# Patient Record
Sex: FEMALE | Race: WHITE | Employment: FULL TIME | URBAN - METROPOLITAN AREA
[De-identification: names, ages, dates, MRNs, and addresses within clinical notes are randomized per-mention and may not be internally consistent; named-entity substitution may affect disease eponyms.]

---

## 2020-08-20 ENCOUNTER — OFFICE VISIT (OUTPATIENT)
Dept: FAMILY MEDICINE CLINIC | Age: 45
End: 2020-08-20
Payer: COMMERCIAL

## 2020-08-20 VITALS
OXYGEN SATURATION: 100 % | WEIGHT: 165 LBS | SYSTOLIC BLOOD PRESSURE: 134 MMHG | TEMPERATURE: 97.8 F | RESPIRATION RATE: 20 BRPM | HEIGHT: 61 IN | HEART RATE: 100 BPM | BODY MASS INDEX: 31.15 KG/M2 | DIASTOLIC BLOOD PRESSURE: 80 MMHG

## 2020-08-20 PROCEDURE — 99203 OFFICE O/P NEW LOW 30 MIN: CPT | Performed by: INTERNAL MEDICINE

## 2020-08-20 ASSESSMENT — ENCOUNTER SYMPTOMS
NAUSEA: 0
SORE THROAT: 0
EYE PAIN: 0
BLOOD IN STOOL: 0
SHORTNESS OF BREATH: 0
EYE DISCHARGE: 0
SINUS PAIN: 0
ABDOMINAL PAIN: 0

## 2020-08-20 ASSESSMENT — PATIENT HEALTH QUESTIONNAIRE - PHQ9
SUM OF ALL RESPONSES TO PHQ QUESTIONS 1-9: 2
SUM OF ALL RESPONSES TO PHQ9 QUESTIONS 1 & 2: 2
2. FEELING DOWN, DEPRESSED OR HOPELESS: 1
SUM OF ALL RESPONSES TO PHQ QUESTIONS 1-9: 2
1. LITTLE INTEREST OR PLEASURE IN DOING THINGS: 1

## 2020-08-20 NOTE — PROGRESS NOTES
Chief Complaint   Patient presents with    New Patient     Here to establish care, LS a PCP over 5 years ago. Patient follows with OBGYN    Insomnia     Patient unable to stay asleep, and wakes up screamimg according to her     Other     Spot on her R ankle/leg area that came up about 7 years but has increased in size        HPI:  Patient is here as new patient    Used to live in [facility]    Allergy and Medications are reviewed and updated. Past Medical History, Surgical History, and Family History has been reviewed and updated. Review of Systems:  Review of Systems   Constitutional: Negative for chills and fever. HENT: Negative for congestion, sinus pain and sore throat. Eyes: Negative for pain and discharge. Respiratory: Negative for shortness of breath (No new SOb). Cardiovascular: Negative for chest pain. Gastrointestinal: Negative for abdominal pain, blood in stool and nausea. Genitourinary: Negative for flank pain and frequency. Musculoskeletal: Negative for neck pain. Hematological: Does not bruise/bleed easily. Psychiatric/Behavioral: Negative for suicidal ideas. Vitals:    08/20/20 1139   BP: 134/80   Pulse: 100   Resp: 20   Temp: 97.8 °F (36.6 °C)   TempSrc: Temporal   SpO2: 100%   Weight: 165 lb (74.8 kg)   Height: 5' 1\" (1.549 m)       Physical Exam  Vitals signs reviewed. Constitutional:       Appearance: She is well-developed. HENT:      Head: Normocephalic and atraumatic. Mouth/Throat:      Pharynx: No oropharyngeal exudate. Eyes:      Conjunctiva/sclera: Conjunctivae normal.      Pupils: Pupils are equal, round, and reactive to light. Neck:      Vascular: No JVD. Cardiovascular:      Rate and Rhythm: Normal rate and regular rhythm. Pulmonary:      Effort: Pulmonary effort is normal.      Breath sounds: Normal breath sounds. No rales. Abdominal:      General: Bowel sounds are normal.      Palpations: Abdomen is soft.    Musculoskeletal: Normal range of motion. Lymphadenopathy:      Cervical: No cervical adenopathy. Skin:     General: Skin is warm and dry. Neurological:      Mental Status: She is alert and oriented to person, place, and time. Psychiatric:         Behavior: Behavior normal.          Labs :    Lab Results   Component Value Date    WBC 10.6 06/29/2017    HGB 9.6 (L) 06/29/2017    HCT 33.0 (L) 06/29/2017     06/29/2017    ALT 7 06/29/2017    AST 18 06/29/2017     06/29/2017    K 4.0 06/29/2017     06/29/2017    CREATININE 0.9 06/29/2017    BUN 11 06/29/2017    CO2 22 06/29/2017     Lab Results   Component Value Date    COLORU RED 06/29/2017    NITRU Negative 06/29/2017    GLUCOSEU Negative 06/29/2017    KETUA TRACE 06/29/2017    UROBILINOGEN 1.0 06/29/2017    BILIRUBINUR SMALL 06/29/2017           ASSESSMENT   There are no active problems to display for this patient. Diagnosis:     ICD-10-CM    1. Establishing care with new doctor, encounter for  Z76.89 CBC Auto Differential     Comprehensive Metabolic Panel, Fasting     Lipid, Fasting     TSH without Reflex     Urinalysis with Microscopic   2. Panic anxiety syndrome  F41.0 External Referral To Psychiatry   3. Vitamin D deficiency  E55.9 Vitamin D 25 Hydroxy       PLAN:       Pt is stable on current medical treatment. Continue current treatment plan    Side effects/Adverse effects/Precautions are reviewed with patient. Low salt, Low Carb diet an low fat diet  Continue medications as advised and take them regularly  Regular exercises as advised    Discussed natural and expected course of this diagnosis and need to alert me if symptoms do not follow expected course, or if any worse. Smoking cessation if applicable, discussed with patient. Ref to Dr Nunu Baxter       There are no Patient Instructions on file for this visit. Return in about 3 weeks (around 9/10/2020).

## 2020-09-11 ENCOUNTER — HOSPITAL ENCOUNTER (OUTPATIENT)
Age: 45
Discharge: HOME | End: 2020-09-13
Payer: COMMERCIAL

## 2020-09-11 LAB
ALBUMIN SERPL-MCNC: 4.2 G/DL (ref 3.5–5.2)
ALP BLD-CCNC: 55 U/L (ref 35–104)
ALT SERPL-CCNC: 6 U/L (ref 0–32)
ANION GAP SERPL CALCULATED.3IONS-SCNC: 16 MMOL/L (ref 7–16)
ANISOCYTOSIS: ABNORMAL
AST SERPL-CCNC: 11 U/L (ref 0–31)
BACTERIA: NORMAL /HPF
BASOPHILS ABSOLUTE: 0.04 E9/L (ref 0–0.2)
BASOPHILS RELATIVE PERCENT: 0.5 % (ref 0–2)
BILIRUB SERPL-MCNC: <0.2 MG/DL (ref 0–1.2)
BILIRUBIN URINE: NEGATIVE
BLOOD, URINE: NEGATIVE
BUN BLDV-MCNC: 11 MG/DL (ref 6–20)
BURR CELLS: ABNORMAL
CALCIUM SERPL-MCNC: 9.2 MG/DL (ref 8.6–10.2)
CHLORIDE BLD-SCNC: 106 MMOL/L (ref 98–107)
CHOLESTEROL, FASTING: 149 MG/DL (ref 0–199)
CLARITY: CLEAR
CO2: 20 MMOL/L (ref 22–29)
COLOR: YELLOW
CREAT SERPL-MCNC: 1 MG/DL (ref 0.5–1)
EOSINOPHILS ABSOLUTE: 0.26 E9/L (ref 0.05–0.5)
EOSINOPHILS RELATIVE PERCENT: 3.2 % (ref 0–6)
GFR AFRICAN AMERICAN: >60
GFR NON-AFRICAN AMERICAN: 60 ML/MIN/1.73
GLUCOSE FASTING: 98 MG/DL (ref 74–99)
GLUCOSE URINE: NEGATIVE MG/DL
HCT VFR BLD CALC: 32.6 % (ref 34–48)
HDLC SERPL-MCNC: 42 MG/DL
HEMOGLOBIN: 8.5 G/DL (ref 11.5–15.5)
HYPOCHROMIA: ABNORMAL
IMMATURE GRANULOCYTES #: 0.03 E9/L
IMMATURE GRANULOCYTES %: 0.4 % (ref 0–5)
KETONES, URINE: NEGATIVE MG/DL
LDL CHOLESTEROL CALCULATED: 90 MG/DL (ref 0–99)
LEUKOCYTE ESTERASE, URINE: NEGATIVE
LYMPHOCYTES ABSOLUTE: 2 E9/L (ref 1.5–4)
LYMPHOCYTES RELATIVE PERCENT: 24.5 % (ref 20–42)
MCH RBC QN AUTO: 16.6 PG (ref 26–35)
MCHC RBC AUTO-ENTMCNC: 26.1 % (ref 32–34.5)
MCV RBC AUTO: 63.8 FL (ref 80–99.9)
MONOCYTES ABSOLUTE: 0.52 E9/L (ref 0.1–0.95)
MONOCYTES RELATIVE PERCENT: 6.4 % (ref 2–12)
NEUTROPHILS ABSOLUTE: 5.32 E9/L (ref 1.8–7.3)
NEUTROPHILS RELATIVE PERCENT: 65 % (ref 43–80)
NITRITE, URINE: NEGATIVE
OVALOCYTES: ABNORMAL
PDW BLD-RTO: 20.2 FL (ref 11.5–15)
PH UA: 5.5 (ref 5–9)
PLATELET # BLD: 350 E9/L (ref 130–450)
PMV BLD AUTO: 10.8 FL (ref 7–12)
POIKILOCYTES: ABNORMAL
POLYCHROMASIA: ABNORMAL
POTASSIUM SERPL-SCNC: 4.1 MMOL/L (ref 3.5–5)
PROTEIN UA: NEGATIVE MG/DL
RBC # BLD: 5.11 E12/L (ref 3.5–5.5)
RBC UA: NORMAL /HPF (ref 0–2)
SODIUM BLD-SCNC: 142 MMOL/L (ref 132–146)
SPECIFIC GRAVITY UA: 1.02 (ref 1–1.03)
TEAR DROP CELLS: ABNORMAL
TOTAL PROTEIN: 6.8 G/DL (ref 6.4–8.3)
TRIGLYCERIDE, FASTING: 85 MG/DL (ref 0–149)
TSH SERPL DL<=0.05 MIU/L-ACNC: 4.97 UIU/ML (ref 0.27–4.2)
UROBILINOGEN, URINE: 0.2 E.U./DL
VITAMIN D 25-HYDROXY: 28 NG/ML (ref 30–100)
VLDLC SERPL CALC-MCNC: 17 MG/DL
WBC # BLD: 8.2 E9/L (ref 4.5–11.5)
WBC UA: NORMAL /HPF (ref 0–5)

## 2020-09-11 PROCEDURE — 80053 COMPREHEN METABOLIC PANEL: CPT

## 2020-09-11 PROCEDURE — 36415 COLL VENOUS BLD VENIPUNCTURE: CPT

## 2020-09-11 PROCEDURE — 80061 LIPID PANEL: CPT

## 2020-09-11 PROCEDURE — 81001 URINALYSIS AUTO W/SCOPE: CPT

## 2020-09-11 PROCEDURE — 82306 VITAMIN D 25 HYDROXY: CPT

## 2020-09-11 PROCEDURE — 85025 COMPLETE CBC W/AUTO DIFF WBC: CPT

## 2020-09-11 PROCEDURE — 84443 ASSAY THYROID STIM HORMONE: CPT

## 2020-09-21 ENCOUNTER — OFFICE VISIT (OUTPATIENT)
Dept: FAMILY MEDICINE CLINIC | Age: 45
End: 2020-09-21
Payer: COMMERCIAL

## 2020-09-21 VITALS
HEART RATE: 84 BPM | DIASTOLIC BLOOD PRESSURE: 72 MMHG | BODY MASS INDEX: 31.15 KG/M2 | TEMPERATURE: 97.7 F | OXYGEN SATURATION: 100 % | RESPIRATION RATE: 14 BRPM | SYSTOLIC BLOOD PRESSURE: 124 MMHG | WEIGHT: 165 LBS | HEIGHT: 61 IN

## 2020-09-21 PROCEDURE — 99213 OFFICE O/P EST LOW 20 MIN: CPT | Performed by: INTERNAL MEDICINE

## 2020-09-21 ASSESSMENT — ENCOUNTER SYMPTOMS
EYE PAIN: 0
SORE THROAT: 0
SINUS PAIN: 0
BLOOD IN STOOL: 0
ABDOMINAL PAIN: 0
NAUSEA: 0
SHORTNESS OF BREATH: 0
EYE DISCHARGE: 0

## 2020-09-21 NOTE — PROGRESS NOTES
Chief Complaint   Patient presents with    Discuss Labs       HPI:  Patient is here for follow-up    Has appt with counselor next week        Allergy and Medications are reviewed and updated. Past Medical History, Surgical History, and Family History has been reviewed and updated. Review of Systems:  Review of Systems   Constitutional: Negative for chills and fever. HENT: Negative for congestion, sinus pain and sore throat. Eyes: Negative for pain and discharge. Respiratory: Negative for shortness of breath (No new SOb). Cardiovascular: Negative for chest pain. Gastrointestinal: Negative for abdominal pain, blood in stool and nausea. Genitourinary: Negative for flank pain and frequency. Musculoskeletal: Negative for neck pain. Hematological: Does not bruise/bleed easily. Psychiatric/Behavioral: Negative for suicidal ideas. Vitals:    09/21/20 1534   BP: 124/72   Pulse: 84   Resp: 14   Temp: 97.7 °F (36.5 °C)   TempSrc: Temporal   SpO2: 100%   Weight: 165 lb (74.8 kg)   Height: 5' 1\" (1.549 m)       Physical Exam  Vitals signs reviewed. Constitutional:       Appearance: She is well-developed. HENT:      Head: Normocephalic and atraumatic. Mouth/Throat:      Pharynx: No oropharyngeal exudate. Eyes:      Conjunctiva/sclera: Conjunctivae normal.      Pupils: Pupils are equal, round, and reactive to light. Neck:      Vascular: No JVD. Cardiovascular:      Rate and Rhythm: Normal rate and regular rhythm. Pulmonary:      Effort: Pulmonary effort is normal.      Breath sounds: Normal breath sounds. No rales. Abdominal:      General: Bowel sounds are normal.      Palpations: Abdomen is soft. Musculoskeletal: Normal range of motion. Lymphadenopathy:      Cervical: No cervical adenopathy. Skin:     General: Skin is warm and dry. Neurological:      Mental Status: She is alert and oriented to person, place, and time.    Psychiatric:         Behavior: Behavior normal. Labs :    Lab Results   Component Value Date    WBC 8.2 09/11/2020    HGB 8.5 (L) 09/11/2020    HCT 32.6 (L) 09/11/2020     09/11/2020    HDL 42 09/11/2020    ALT 6 09/11/2020    AST 11 09/11/2020     09/11/2020    K 4.1 09/11/2020     09/11/2020    CREATININE 1.0 09/11/2020    BUN 11 09/11/2020    CO2 20 (L) 09/11/2020    TSH 4.970 (H) 09/11/2020    GLUF 98 09/11/2020     Lab Results   Component Value Date    COLORU Yellow 09/11/2020    NITRU Negative 09/11/2020    GLUCOSEU Negative 09/11/2020    KETUA Negative 09/11/2020    UROBILINOGEN 0.2 09/11/2020    BILIRUBINUR Negative 09/11/2020           ASSESSMENT   There are no active problems to display for this patient. Diagnosis:     ICD-10-CM    1. Microcytic anemia  D50.9 Ferritin     Iron and TIBC     IRON SATURATION     Amb External Referral To Gastroenterology   2. Vitamin D deficiency  E55.9    3. Elevated TSH  R79.89        PLAN:      Check Iron lab work    Ref to GI    Pt will follow up with her Ob Gyn     Vit D 3 1000 units daily    TSH is elevated - monitor for now    Pt is stable on current medical treatment. Continue current treatment plan    Side effects/Adverse effects/Precautions are reviewed with patient. Low salt, Low Carb diet an low fat diet  Continue medications as advised and take them regularly  Regular exercises as advised    Discussed natural and expected course of this diagnosis and need to alert me if symptoms do not follow expected course, or if any worse. Smoking cessation if applicable, discussed with patient. There are no Patient Instructions on file for this visit. Return in about 1 month (around 10/21/2020).

## 2020-10-19 ENCOUNTER — HOSPITAL ENCOUNTER (OUTPATIENT)
Age: 45
Discharge: HOME | End: 2020-10-21
Payer: COMMERCIAL

## 2020-10-19 LAB
FERRITIN: 4 NG/ML
IRON SATURATION: 4 % (ref 15–50)
IRON: 20 MCG/DL (ref 37–145)
TOTAL IRON BINDING CAPACITY: 461 MCG/DL (ref 250–450)

## 2020-10-19 PROCEDURE — 83550 IRON BINDING TEST: CPT

## 2020-10-19 PROCEDURE — 83540 ASSAY OF IRON: CPT

## 2020-10-19 PROCEDURE — 36415 COLL VENOUS BLD VENIPUNCTURE: CPT

## 2020-10-19 PROCEDURE — 82728 ASSAY OF FERRITIN: CPT

## 2020-10-22 ENCOUNTER — OFFICE VISIT (OUTPATIENT)
Dept: FAMILY MEDICINE CLINIC | Age: 45
End: 2020-10-22
Payer: COMMERCIAL

## 2020-10-22 VITALS
RESPIRATION RATE: 18 BRPM | BODY MASS INDEX: 31.74 KG/M2 | WEIGHT: 168.1 LBS | HEART RATE: 73 BPM | TEMPERATURE: 97 F | HEIGHT: 61 IN | OXYGEN SATURATION: 100 % | SYSTOLIC BLOOD PRESSURE: 128 MMHG | DIASTOLIC BLOOD PRESSURE: 68 MMHG

## 2020-10-22 PROCEDURE — 99213 OFFICE O/P EST LOW 20 MIN: CPT | Performed by: INTERNAL MEDICINE

## 2020-10-22 RX ORDER — FLUOXETINE HYDROCHLORIDE 20 MG/1
20 CAPSULE ORAL DAILY
COMMUNITY
Start: 2020-09-23

## 2020-10-22 RX ORDER — HYDROXYZINE PAMOATE 50 MG/1
50 CAPSULE ORAL NIGHTLY PRN
COMMUNITY
Start: 2020-09-23 | End: 2022-01-01

## 2020-10-22 RX ORDER — FERROUS SULFATE 325(65) MG
325 TABLET ORAL
Qty: 90 TABLET | Refills: 0 | Status: SHIPPED
Start: 2020-10-22 | End: 2021-01-14 | Stop reason: SDUPTHER

## 2020-10-22 ASSESSMENT — ENCOUNTER SYMPTOMS
ABDOMINAL PAIN: 0
NAUSEA: 0
SHORTNESS OF BREATH: 0
EYE DISCHARGE: 0
BLOOD IN STOOL: 0
EYE PAIN: 0
SINUS PAIN: 0
SORE THROAT: 0

## 2020-10-22 NOTE — PROGRESS NOTES
Chief Complaint   Patient presents with    Follow-up    Discuss Labs       HPI:  Patient is here for follow-up     Feel okay     Pt is following with GI and also with Counsellor     Feeling okay   Allergy and Medications are reviewed and updated. Past Medical History, Surgical History, and Family History has been reviewed and updated. Review of Systems:  Review of Systems   Constitutional: Negative for chills and fever. HENT: Negative for congestion, sinus pain and sore throat. Eyes: Negative for pain and discharge. Respiratory: Negative for shortness of breath (No new SOb). Cardiovascular: Negative for chest pain. Gastrointestinal: Negative for abdominal pain, blood in stool and nausea. Genitourinary: Negative for flank pain and frequency. Musculoskeletal: Negative for neck pain. Hematological: Does not bruise/bleed easily. Psychiatric/Behavioral: Negative for suicidal ideas. Vitals:    10/22/20 1554   BP: 128/68   Pulse: 73   Resp: 18   Temp: 97 °F (36.1 °C)   SpO2: 100%   Weight: 168 lb 1.6 oz (76.2 kg)   Height: 5' 1\" (1.549 m)       Physical Exam  Vitals signs reviewed. Constitutional:       Appearance: She is well-developed. HENT:      Head: Normocephalic and atraumatic. Mouth/Throat:      Pharynx: No oropharyngeal exudate. Eyes:      Conjunctiva/sclera: Conjunctivae normal.      Pupils: Pupils are equal, round, and reactive to light. Neck:      Vascular: No JVD. Cardiovascular:      Rate and Rhythm: Normal rate and regular rhythm. Pulmonary:      Effort: Pulmonary effort is normal.      Breath sounds: Normal breath sounds. No rales. Abdominal:      General: Bowel sounds are normal.      Palpations: Abdomen is soft. Musculoskeletal: Normal range of motion. Lymphadenopathy:      Cervical: No cervical adenopathy. Skin:     General: Skin is warm and dry. Neurological:      Mental Status: She is alert and oriented to person, place, and time. Psychiatric:         Behavior: Behavior normal.          Labs :    Lab Results   Component Value Date    WBC 8.2 09/11/2020    HGB 8.5 (L) 09/11/2020    HCT 32.6 (L) 09/11/2020     09/11/2020    HDL 42 09/11/2020    ALT 6 09/11/2020    AST 11 09/11/2020     09/11/2020    K 4.1 09/11/2020     09/11/2020    CREATININE 1.0 09/11/2020    BUN 11 09/11/2020    CO2 20 (L) 09/11/2020    TSH 4.970 (H) 09/11/2020    GLUF 98 09/11/2020     Lab Results   Component Value Date    COLORU Yellow 09/11/2020    NITRU Negative 09/11/2020    GLUCOSEU Negative 09/11/2020    KETUA Negative 09/11/2020    UROBILINOGEN 0.2 09/11/2020    BILIRUBINUR Negative 09/11/2020           ASSESSMENT   There are no active problems to display for this patient. Diagnosis:     ICD-10-CM    1. Microcytic anemia  D50.9    2. Panic anxiety syndrome  F41.0        PLAN:      Labs reviewed    Has HIRA    Following with GI, adv to f/u with Ob Gyn of her choice    Pt is stable on current medical treatment. Continue current treatment plan    Side effects/Adverse effects/Precautions are reviewed with patient. Low salt, Low Carb diet an low fat diet  Continue medications as advised and take them regularly  Regular exercises as advised    Discussed natural and expected course of this diagnosis and need to alert me if symptoms do not follow expected course, or if any worse. Smoking cessation if applicable, discussed with patient. There are no Patient Instructions on file for this visit. Return in about 2 months (around 12/22/2020).

## 2021-01-01 ENCOUNTER — OFFICE VISIT (OUTPATIENT)
Dept: FAMILY MEDICINE CLINIC | Age: 46
End: 2021-01-01
Payer: COMMERCIAL

## 2021-01-01 ENCOUNTER — HOSPITAL ENCOUNTER (OUTPATIENT)
Dept: GENERAL RADIOLOGY | Age: 46
Discharge: HOME | End: 2021-12-31
Payer: COMMERCIAL

## 2021-01-01 VITALS
RESPIRATION RATE: 16 BRPM | HEIGHT: 61 IN | HEART RATE: 86 BPM | OXYGEN SATURATION: 98 % | SYSTOLIC BLOOD PRESSURE: 116 MMHG | DIASTOLIC BLOOD PRESSURE: 74 MMHG | TEMPERATURE: 97.5 F | WEIGHT: 179.1 LBS | BODY MASS INDEX: 33.81 KG/M2

## 2021-01-01 DIAGNOSIS — G89.29 CHRONIC PAIN IN RIGHT FOOT: ICD-10-CM

## 2021-01-01 DIAGNOSIS — N63.10 BREAST MASS, RIGHT: ICD-10-CM

## 2021-01-01 DIAGNOSIS — Z12.11 COLON CANCER SCREENING: ICD-10-CM

## 2021-01-01 DIAGNOSIS — D50.9 MICROCYTIC ANEMIA: ICD-10-CM

## 2021-01-01 DIAGNOSIS — M79.671 CHRONIC PAIN IN RIGHT FOOT: ICD-10-CM

## 2021-01-01 DIAGNOSIS — E87.6 LOW BLOOD POTASSIUM: ICD-10-CM

## 2021-01-01 DIAGNOSIS — H60.501 ACUTE OTITIS EXTERNA OF RIGHT EAR, UNSPECIFIED TYPE: Primary | ICD-10-CM

## 2021-01-01 PROCEDURE — G0279 TOMOSYNTHESIS, MAMMO: HCPCS

## 2021-01-01 PROCEDURE — 76642 ULTRASOUND BREAST LIMITED: CPT

## 2021-01-01 PROCEDURE — 99214 OFFICE O/P EST MOD 30 MIN: CPT | Performed by: INTERNAL MEDICINE

## 2021-01-01 RX ORDER — AZITHROMYCIN 250 MG/1
TABLET, FILM COATED ORAL
Qty: 6 TABLET | Refills: 0 | Status: SHIPPED
Start: 2021-01-01 | End: 2022-01-01

## 2021-01-01 ASSESSMENT — ENCOUNTER SYMPTOMS
NAUSEA: 0
ABDOMINAL PAIN: 0
EYE DISCHARGE: 0
BLOOD IN STOOL: 0
SINUS PAIN: 0
EYE PAIN: 0
SHORTNESS OF BREATH: 0
SORE THROAT: 0

## 2021-01-12 ENCOUNTER — TELEPHONE (OUTPATIENT)
Dept: FAMILY MEDICINE CLINIC | Age: 46
End: 2021-01-12

## 2021-01-12 NOTE — TELEPHONE ENCOUNTER
Patient wants to know if you would like any more lab work on her. She will be out of iron medication in 8 days.

## 2021-01-14 ENCOUNTER — OFFICE VISIT (OUTPATIENT)
Dept: FAMILY MEDICINE CLINIC | Age: 46
End: 2021-01-14
Payer: COMMERCIAL

## 2021-01-14 VITALS
HEIGHT: 61 IN | DIASTOLIC BLOOD PRESSURE: 72 MMHG | WEIGHT: 182 LBS | RESPIRATION RATE: 17 BRPM | HEART RATE: 75 BPM | OXYGEN SATURATION: 98 % | BODY MASS INDEX: 34.36 KG/M2 | TEMPERATURE: 97.1 F | SYSTOLIC BLOOD PRESSURE: 120 MMHG

## 2021-01-14 DIAGNOSIS — E55.9 VITAMIN D DEFICIENCY: ICD-10-CM

## 2021-01-14 DIAGNOSIS — D50.9 MICROCYTIC ANEMIA: Primary | ICD-10-CM

## 2021-01-14 DIAGNOSIS — F41.0 PANIC ANXIETY SYNDROME: ICD-10-CM

## 2021-01-14 DIAGNOSIS — R79.89 ELEVATED TSH: ICD-10-CM

## 2021-01-14 PROCEDURE — 99213 OFFICE O/P EST LOW 20 MIN: CPT | Performed by: INTERNAL MEDICINE

## 2021-01-14 RX ORDER — FERROUS SULFATE 325(65) MG
325 TABLET ORAL
Qty: 90 TABLET | Refills: 0 | Status: SHIPPED
Start: 2021-01-14 | End: 2021-01-01

## 2021-01-14 ASSESSMENT — ENCOUNTER SYMPTOMS
SINUS PAIN: 0
SHORTNESS OF BREATH: 0
ABDOMINAL PAIN: 0
SORE THROAT: 0
EYE DISCHARGE: 0
BLOOD IN STOOL: 0
NAUSEA: 0
EYE PAIN: 0

## 2021-01-14 ASSESSMENT — PATIENT HEALTH QUESTIONNAIRE - PHQ9
SUM OF ALL RESPONSES TO PHQ QUESTIONS 1-9: 0
SUM OF ALL RESPONSES TO PHQ9 QUESTIONS 1 & 2: 0
2. FEELING DOWN, DEPRESSED OR HOPELESS: 0

## 2021-01-14 NOTE — PROGRESS NOTES
Chief Complaint   Patient presents with    Other     follow up two mos       HPI:  Patient is here for follow-up     Feel okay  Gain weight since on Prozac   Following with her psych, will discuss with her         Allergy and Medications are reviewed and updated. Past Medical History, Surgical History, and Family History has been reviewed and updated. Review of Systems:  Review of Systems   Constitutional: Negative for chills and fever. HENT: Negative for congestion, sinus pain and sore throat. Eyes: Negative for pain and discharge. Respiratory: Negative for shortness of breath (No new SOb). Cardiovascular: Negative for chest pain. Gastrointestinal: Negative for abdominal pain, blood in stool and nausea. Genitourinary: Negative for flank pain and frequency. Musculoskeletal: Negative for neck pain. Hematological: Does not bruise/bleed easily. Psychiatric/Behavioral: Negative for suicidal ideas. Vitals:    01/14/21 1619   BP: 120/72   Site: Left Upper Arm   Position: Sitting   Cuff Size: Medium Adult   Pulse: 75   Resp: 17   Temp: 97.1 °F (36.2 °C)   TempSrc: Temporal   SpO2: 98%   Weight: 182 lb (82.6 kg)   Height: 5' 1\" (1.549 m)       Physical Exam  Vitals signs reviewed. Constitutional:       Appearance: She is well-developed. HENT:      Head: Normocephalic and atraumatic. Mouth/Throat:      Pharynx: No oropharyngeal exudate. Eyes:      Conjunctiva/sclera: Conjunctivae normal.      Pupils: Pupils are equal, round, and reactive to light. Neck:      Vascular: No JVD. Cardiovascular:      Rate and Rhythm: Normal rate and regular rhythm. Pulmonary:      Effort: Pulmonary effort is normal.      Breath sounds: Normal breath sounds. No rales. Abdominal:      General: Bowel sounds are normal.      Palpations: Abdomen is soft. Musculoskeletal: Normal range of motion. Lymphadenopathy:      Cervical: No cervical adenopathy. Skin:     General: Skin is warm and dry. Neurological:      Mental Status: She is alert and oriented to person, place, and time. Psychiatric:         Behavior: Behavior normal.          Labs :    Lab Results   Component Value Date    WBC 8.2 09/11/2020    HGB 8.5 (L) 09/11/2020    HCT 32.6 (L) 09/11/2020     09/11/2020    HDL 42 09/11/2020    ALT 6 09/11/2020    AST 11 09/11/2020     09/11/2020    K 4.1 09/11/2020     09/11/2020    CREATININE 1.0 09/11/2020    BUN 11 09/11/2020    CO2 20 (L) 09/11/2020    TSH 4.970 (H) 09/11/2020    GLUF 98 09/11/2020     Lab Results   Component Value Date    COLORU Yellow 09/11/2020    NITRU Negative 09/11/2020    GLUCOSEU Negative 09/11/2020    KETUA Negative 09/11/2020    UROBILINOGEN 0.2 09/11/2020    BILIRUBINUR Negative 09/11/2020           ASSESSMENT   There are no active problems to display for this patient. Diagnosis:     ICD-10-CM    1. Microcytic anemia  D50.9 CBC Auto Differential     Ferritin     IRON AND TIBC   2. Panic anxiety syndrome  F41.0    3. Vitamin D deficiency  E55.9 Vitamin D 25 Hydroxy   4. Elevated TSH  R79.89 TSH without Reflex       PLAN:         Advise to have ( Fasting) lab test prior to next visit. Pt is stable on current medical treatment. Continue current treatment plan    Side effects/Adverse effects/Precautions are reviewed with patient. Low salt, Low Carb diet an low fat diet  Continue medications as advised and take them regularly  Regular exercises as advised    Discussed natural and expected course of this diagnosis and need to alert me if symptoms do not follow expected course, or if any worse. Smoking cessation if applicable, discussed with patient. There are no Patient Instructions on file for this visit. Return in about 2 months (around 3/14/2021).

## 2021-03-15 DIAGNOSIS — R79.89 ELEVATED TSH: ICD-10-CM

## 2021-03-15 DIAGNOSIS — D50.9 MICROCYTIC ANEMIA: ICD-10-CM

## 2021-03-15 DIAGNOSIS — E55.9 VITAMIN D DEFICIENCY: ICD-10-CM

## 2021-03-15 LAB
BASOPHILS ABSOLUTE: 0.09 E9/L (ref 0–0.2)
BASOPHILS RELATIVE PERCENT: 0.8 % (ref 0–2)
EOSINOPHILS ABSOLUTE: 0.39 E9/L (ref 0.05–0.5)
EOSINOPHILS RELATIVE PERCENT: 3.4 % (ref 0–6)
FERRITIN: 33 NG/ML
HCT VFR BLD CALC: 48.4 % (ref 34–48)
HEMOGLOBIN: 15.4 G/DL (ref 11.5–15.5)
IMMATURE GRANULOCYTES #: 0.03 E9/L
IMMATURE GRANULOCYTES %: 0.3 % (ref 0–5)
IRON SATURATION: 20 % (ref 15–50)
IRON: 66 MCG/DL (ref 37–145)
LYMPHOCYTES ABSOLUTE: 2.66 E9/L (ref 1.5–4)
LYMPHOCYTES RELATIVE PERCENT: 23 % (ref 20–42)
MCH RBC QN AUTO: 27.5 PG (ref 26–35)
MCHC RBC AUTO-ENTMCNC: 31.8 % (ref 32–34.5)
MCV RBC AUTO: 86.4 FL (ref 80–99.9)
MONOCYTES ABSOLUTE: 0.69 E9/L (ref 0.1–0.95)
MONOCYTES RELATIVE PERCENT: 6 % (ref 2–12)
NEUTROPHILS ABSOLUTE: 7.73 E9/L (ref 1.8–7.3)
NEUTROPHILS RELATIVE PERCENT: 66.5 % (ref 43–80)
PDW BLD-RTO: 14.6 FL (ref 11.5–15)
PLATELET # BLD: 340 E9/L (ref 130–450)
PMV BLD AUTO: 10.4 FL (ref 7–12)
RBC # BLD: 5.6 E12/L (ref 3.5–5.5)
TOTAL IRON BINDING CAPACITY: 327 MCG/DL (ref 250–450)
TSH SERPL DL<=0.05 MIU/L-ACNC: 3.86 UIU/ML (ref 0.27–4.2)
VITAMIN D 25-HYDROXY: 28 NG/ML (ref 30–100)
WBC # BLD: 11.6 E9/L (ref 4.5–11.5)

## 2021-03-17 ENCOUNTER — OFFICE VISIT (OUTPATIENT)
Dept: FAMILY MEDICINE CLINIC | Age: 46
End: 2021-03-17
Payer: COMMERCIAL

## 2021-03-17 VITALS
TEMPERATURE: 97.6 F | SYSTOLIC BLOOD PRESSURE: 125 MMHG | OXYGEN SATURATION: 97 % | DIASTOLIC BLOOD PRESSURE: 70 MMHG | HEIGHT: 61 IN | HEART RATE: 57 BPM | WEIGHT: 185 LBS | RESPIRATION RATE: 18 BRPM | BODY MASS INDEX: 34.93 KG/M2

## 2021-03-17 DIAGNOSIS — E55.9 VITAMIN D DEFICIENCY: ICD-10-CM

## 2021-03-17 DIAGNOSIS — D50.9 MICROCYTIC ANEMIA: Primary | ICD-10-CM

## 2021-03-17 DIAGNOSIS — F41.0 PANIC ANXIETY SYNDROME: ICD-10-CM

## 2021-03-17 PROCEDURE — 99214 OFFICE O/P EST MOD 30 MIN: CPT | Performed by: INTERNAL MEDICINE

## 2021-03-17 SDOH — ECONOMIC STABILITY: TRANSPORTATION INSECURITY
IN THE PAST 12 MONTHS, HAS THE LACK OF TRANSPORTATION KEPT YOU FROM MEDICAL APPOINTMENTS OR FROM GETTING MEDICATIONS?: NO

## 2021-03-17 SDOH — ECONOMIC STABILITY: INCOME INSECURITY: HOW HARD IS IT FOR YOU TO PAY FOR THE VERY BASICS LIKE FOOD, HOUSING, MEDICAL CARE, AND HEATING?: NOT HARD AT ALL

## 2021-03-17 SDOH — ECONOMIC STABILITY: FOOD INSECURITY: WITHIN THE PAST 12 MONTHS, YOU WORRIED THAT YOUR FOOD WOULD RUN OUT BEFORE YOU GOT MONEY TO BUY MORE.: NEVER TRUE

## 2021-03-17 ASSESSMENT — ENCOUNTER SYMPTOMS
SHORTNESS OF BREATH: 0
SORE THROAT: 0
ABDOMINAL PAIN: 0
NAUSEA: 0
BLOOD IN STOOL: 0
EYE PAIN: 0
SINUS PAIN: 0
EYE DISCHARGE: 0

## 2021-03-17 NOTE — PROGRESS NOTES
Chief Complaint   Patient presents with    Anemia     lab results       HPI:  Patient is here for follow-up       Feeling well    No complaints        Allergy and Medications are reviewed and updated. Past Medical History, Surgical History, and Family History has been reviewed and updated. Review of Systems:  Review of Systems   Constitutional: Negative for chills and fever. HENT: Negative for congestion, sinus pain and sore throat. Eyes: Negative for pain and discharge. Respiratory: Negative for shortness of breath (No new SOb). Cardiovascular: Negative for chest pain. Gastrointestinal: Negative for abdominal pain, blood in stool and nausea. Genitourinary: Negative for flank pain and frequency. Musculoskeletal: Negative for neck pain. Hematological: Does not bruise/bleed easily. Psychiatric/Behavioral: Negative for suicidal ideas. Vitals:    03/17/21 1500   BP: 125/70   Site: Left Upper Arm   Position: Sitting   Cuff Size: Medium Adult   Pulse: 57   Resp: 18   Temp: 97.6 °F (36.4 °C)   TempSrc: Temporal   SpO2: 97%   Weight: 185 lb (83.9 kg)   Height: 5' 1\" (1.549 m)       Physical Exam  Vitals signs reviewed. Constitutional:       Appearance: She is well-developed. HENT:      Head: Normocephalic and atraumatic. Mouth/Throat:      Pharynx: No oropharyngeal exudate. Eyes:      Conjunctiva/sclera: Conjunctivae normal.      Pupils: Pupils are equal, round, and reactive to light. Neck:      Vascular: No JVD. Cardiovascular:      Rate and Rhythm: Normal rate and regular rhythm. Pulmonary:      Effort: Pulmonary effort is normal.      Breath sounds: Normal breath sounds. No rales. Abdominal:      General: Bowel sounds are normal.      Palpations: Abdomen is soft. Musculoskeletal: Normal range of motion. Lymphadenopathy:      Cervical: No cervical adenopathy. Skin:     General: Skin is warm and dry.    Neurological:      Mental Status: She is alert and oriented to person, place, and time. Psychiatric:         Behavior: Behavior normal.          Labs :    Lab Results   Component Value Date    WBC 11.6 (H) 03/15/2021    HGB 15.4 03/15/2021    HCT 48.4 (H) 03/15/2021     03/15/2021    HDL 42 09/11/2020    ALT 6 09/11/2020    AST 11 09/11/2020     09/11/2020    K 4.1 09/11/2020     09/11/2020    CREATININE 1.0 09/11/2020    BUN 11 09/11/2020    CO2 20 (L) 09/11/2020    TSH 3.860 03/15/2021    GLUF 98 09/11/2020     Lab Results   Component Value Date    COLORU Yellow 09/11/2020    NITRU Negative 09/11/2020    GLUCOSEU Negative 09/11/2020    KETUA Negative 09/11/2020    UROBILINOGEN 0.2 09/11/2020    BILIRUBINUR Negative 09/11/2020           ASSESSMENT   There are no active problems to display for this patient. Diagnosis:     ICD-10-CM    1. Microcytic anemia  D50.9    2. Vitamin D deficiency  E55.9    3. Panic anxiety syndrome  F41.0        PLAN:      Pt is feeling better    Following with Counsellor , on Prozac 20 mg    Pt is concern about her weight    Adv for low caloric diet and also suggest to discuss with her psych and if lower dose of Prozac is appropriate     Labs reviewed    Iron and iron panel - Improving  CBC- h/h is better  Vit D 28  TSH - N    Stop Iron tab   Increase Vit D to 2000 units     Reminded to f/u with Dr Katiuska Sharma     Pt is stable on current medical treatment. Continue current treatment plan    Side effects/Adverse effects/Precautions are reviewed with patient. Low salt, Low Carb diet an low fat diet  Continue medications as advised and take them regularly  Regular exercises as advised    Discussed natural and expected course of this diagnosis and need to alert me if symptoms do not follow expected course, or if any worse. Smoking cessation if applicable, discussed with patient.            Patient Instructions   The medication list included in this document is our record of what you are currently taking, including any changes that were made at today's visit.  If you find any differences when compared to your medications at home, or have any questions that were not answered at your visit, please contact the office. Return in about 2 months (around 5/17/2021).

## 2021-05-19 ENCOUNTER — OFFICE VISIT (OUTPATIENT)
Dept: FAMILY MEDICINE CLINIC | Age: 46
End: 2021-05-19
Payer: COMMERCIAL

## 2021-05-19 VITALS
HEART RATE: 82 BPM | OXYGEN SATURATION: 98 % | DIASTOLIC BLOOD PRESSURE: 78 MMHG | TEMPERATURE: 97.1 F | RESPIRATION RATE: 16 BRPM | HEIGHT: 61 IN | BODY MASS INDEX: 35.19 KG/M2 | WEIGHT: 186.4 LBS | SYSTOLIC BLOOD PRESSURE: 118 MMHG

## 2021-05-19 DIAGNOSIS — F41.0 PANIC ANXIETY SYNDROME: ICD-10-CM

## 2021-05-19 DIAGNOSIS — M79.671 CHRONIC PAIN IN RIGHT FOOT: ICD-10-CM

## 2021-05-19 DIAGNOSIS — R79.89 ELEVATED TSH: ICD-10-CM

## 2021-05-19 DIAGNOSIS — G89.29 CHRONIC PAIN IN RIGHT FOOT: ICD-10-CM

## 2021-05-19 DIAGNOSIS — D50.9 MICROCYTIC ANEMIA: Primary | ICD-10-CM

## 2021-05-19 PROCEDURE — 99214 OFFICE O/P EST MOD 30 MIN: CPT | Performed by: INTERNAL MEDICINE

## 2021-05-19 ASSESSMENT — ENCOUNTER SYMPTOMS
BLOOD IN STOOL: 0
SINUS PAIN: 0
SHORTNESS OF BREATH: 0
EYE PAIN: 0
EYE DISCHARGE: 0
SORE THROAT: 0
NAUSEA: 0
ABDOMINAL PAIN: 0

## 2021-05-19 NOTE — PROGRESS NOTES
Chief Complaint   Patient presents with    Anemia     Patient States that she ahs been bleeding February 26 she says its not as heavy as previous and is concerned that it may affect her iron     Follow-up     Had A Tubal Ligation 2 weeks ago        HPI:  Patient is here for follow-up     Feel okay    Had Ablation and Tubal ligation by Dr Salbador Cespedes- her Ob Gyn    Had Rt foot/ankle surgery 3/2015- [facility]     Has rt ankle/foot pain for few yrs now     Allergy and Medications are reviewed and updated. Past Medical History, Surgical History, and Family History has been reviewed and updated. Review of Systems:  Review of Systems   Constitutional: Negative for chills and fever. HENT: Negative for congestion, sinus pain and sore throat. Eyes: Negative for pain and discharge. Respiratory: Negative for shortness of breath (No new SOb). Cardiovascular: Negative for chest pain. Gastrointestinal: Negative for abdominal pain, blood in stool and nausea. Genitourinary: Negative for flank pain and frequency. Musculoskeletal: Negative for neck pain. Hematological: Does not bruise/bleed easily. Psychiatric/Behavioral: Negative for suicidal ideas. Vitals:    05/19/21 1409   BP: 118/78   Pulse: 82   Resp: 16   Temp: 97.1 °F (36.2 °C)   TempSrc: Temporal   SpO2: 98%   Weight: 186 lb 6.4 oz (84.6 kg)   Height: 5' 1\" (1.549 m)       Physical Exam  Vitals reviewed. Constitutional:       Appearance: She is well-developed. HENT:      Head: Normocephalic and atraumatic. Mouth/Throat:      Pharynx: No oropharyngeal exudate. Eyes:      Conjunctiva/sclera: Conjunctivae normal.      Pupils: Pupils are equal, round, and reactive to light. Neck:      Vascular: No JVD. Cardiovascular:      Rate and Rhythm: Normal rate and regular rhythm. Pulmonary:      Effort: Pulmonary effort is normal.      Breath sounds: Normal breath sounds. No rales.    Abdominal:      General: Bowel sounds are normal. Palpations: Abdomen is soft. Musculoskeletal:         General: Normal range of motion. Lymphadenopathy:      Cervical: No cervical adenopathy. Skin:     General: Skin is warm and dry. Neurological:      Mental Status: She is alert and oriented to person, place, and time. Psychiatric:         Behavior: Behavior normal.          Labs :    Lab Results   Component Value Date    WBC 11.6 (H) 03/15/2021    HGB 15.4 03/15/2021    HCT 48.4 (H) 03/15/2021     03/15/2021    HDL 42 09/11/2020    ALT 6 09/11/2020    AST 11 09/11/2020     09/11/2020    K 4.1 09/11/2020     09/11/2020    CREATININE 1.0 09/11/2020    BUN 11 09/11/2020    CO2 20 (L) 09/11/2020    TSH 3.860 03/15/2021    GLUF 98 09/11/2020     Lab Results   Component Value Date    COLORU Yellow 09/11/2020    NITRU Negative 09/11/2020    GLUCOSEU Negative 09/11/2020    KETUA Negative 09/11/2020    UROBILINOGEN 0.2 09/11/2020    BILIRUBINUR Negative 09/11/2020             ASSESSMENT   There are no problems to display for this patient. Diagnosis:     ICD-10-CM    1. Microcytic anemia  D50.9 Comprehensive Metabolic Panel, Fasting     CBC Auto Differential   2. Elevated TSH  R79.89 TSH without Reflex   3. Panic anxiety syndrome  F41.0 TSH without Reflex   4. Chronic pain in right foot  M79.671 XR ANKLE RIGHT (MIN 3 VIEWS)    G89.29 XR FOOT RIGHT (MIN 3 VIEWS)       PLAN:      Pt is following with her Ob gyn - next week    Also following with Dr Marie Koch - GI, pt had to zach her colonoscopy - reminded to do so       Will rpt labs    XR rt ankle and foot      Pt is stable on current medical treatment. Continue current treatment plan    Side effects/Adverse effects/Precautions are reviewed with patient.      Low salt, Low Carb diet an low fat diet  Continue medications as advised and take them regularly  Regular exercises as advised    Discussed natural and expected course of this diagnosis and need to alert me if symptoms do not follow

## 2021-07-08 DIAGNOSIS — R79.89 ELEVATED TSH: ICD-10-CM

## 2021-07-08 DIAGNOSIS — D50.9 MICROCYTIC ANEMIA: ICD-10-CM

## 2021-07-08 DIAGNOSIS — F41.0 PANIC ANXIETY SYNDROME: ICD-10-CM

## 2021-07-08 LAB
ALBUMIN SERPL-MCNC: 4.3 G/DL (ref 3.5–5.2)
ALP BLD-CCNC: 70 U/L (ref 35–104)
ALT SERPL-CCNC: 10 U/L (ref 0–32)
ANION GAP SERPL CALCULATED.3IONS-SCNC: 13 MMOL/L (ref 7–16)
AST SERPL-CCNC: 15 U/L (ref 0–31)
BASOPHILS ABSOLUTE: 0.05 E9/L (ref 0–0.2)
BASOPHILS RELATIVE PERCENT: 0.5 % (ref 0–2)
BILIRUB SERPL-MCNC: 0.2 MG/DL (ref 0–1.2)
BUN BLDV-MCNC: 8 MG/DL (ref 6–20)
CALCIUM SERPL-MCNC: 9.2 MG/DL (ref 8.6–10.2)
CHLORIDE BLD-SCNC: 103 MMOL/L (ref 98–107)
CO2: 25 MMOL/L (ref 22–29)
CREAT SERPL-MCNC: 1 MG/DL (ref 0.5–1)
EOSINOPHILS ABSOLUTE: 0.29 E9/L (ref 0.05–0.5)
EOSINOPHILS RELATIVE PERCENT: 3 % (ref 0–6)
GFR AFRICAN AMERICAN: >60
GFR NON-AFRICAN AMERICAN: 59 ML/MIN/1.73
GLUCOSE FASTING: 109 MG/DL (ref 74–99)
HCT VFR BLD CALC: 42.6 % (ref 34–48)
HEMOGLOBIN: 13 G/DL (ref 11.5–15.5)
IMMATURE GRANULOCYTES #: 0.05 E9/L
IMMATURE GRANULOCYTES %: 0.5 % (ref 0–5)
LYMPHOCYTES ABSOLUTE: 1.56 E9/L (ref 1.5–4)
LYMPHOCYTES RELATIVE PERCENT: 16.3 % (ref 20–42)
MCH RBC QN AUTO: 24.7 PG (ref 26–35)
MCHC RBC AUTO-ENTMCNC: 30.5 % (ref 32–34.5)
MCV RBC AUTO: 81 FL (ref 80–99.9)
MONOCYTES ABSOLUTE: 0.52 E9/L (ref 0.1–0.95)
MONOCYTES RELATIVE PERCENT: 5.4 % (ref 2–12)
NEUTROPHILS ABSOLUTE: 7.11 E9/L (ref 1.8–7.3)
NEUTROPHILS RELATIVE PERCENT: 74.3 % (ref 43–80)
PDW BLD-RTO: 13.8 FL (ref 11.5–15)
PLATELET # BLD: 380 E9/L (ref 130–450)
PMV BLD AUTO: 10.8 FL (ref 7–12)
POTASSIUM SERPL-SCNC: 3.4 MMOL/L (ref 3.5–5)
RBC # BLD: 5.26 E12/L (ref 3.5–5.5)
SODIUM BLD-SCNC: 141 MMOL/L (ref 132–146)
TOTAL PROTEIN: 7 G/DL (ref 6.4–8.3)
TSH SERPL DL<=0.05 MIU/L-ACNC: 2.05 UIU/ML (ref 0.27–4.2)
WBC # BLD: 9.6 E9/L (ref 4.5–11.5)

## 2021-07-12 ENCOUNTER — OFFICE VISIT (OUTPATIENT)
Dept: FAMILY MEDICINE CLINIC | Age: 46
End: 2021-07-12
Payer: COMMERCIAL

## 2021-07-12 ENCOUNTER — TELEPHONE (OUTPATIENT)
Dept: FAMILY MEDICINE CLINIC | Age: 46
End: 2021-07-12

## 2021-07-12 VITALS
BODY MASS INDEX: 33.91 KG/M2 | SYSTOLIC BLOOD PRESSURE: 146 MMHG | RESPIRATION RATE: 16 BRPM | HEIGHT: 61 IN | HEART RATE: 65 BPM | WEIGHT: 179.6 LBS | DIASTOLIC BLOOD PRESSURE: 83 MMHG | OXYGEN SATURATION: 99 % | TEMPERATURE: 97.4 F

## 2021-07-12 DIAGNOSIS — H60.501 ACUTE OTITIS EXTERNA OF RIGHT EAR, UNSPECIFIED TYPE: Primary | ICD-10-CM

## 2021-07-12 DIAGNOSIS — J06.9 UPPER RESPIRATORY TRACT INFECTION, UNSPECIFIED TYPE: ICD-10-CM

## 2021-07-12 PROCEDURE — 99213 OFFICE O/P EST LOW 20 MIN: CPT | Performed by: PHYSICIAN ASSISTANT

## 2021-07-12 RX ORDER — AZITHROMYCIN 250 MG/1
TABLET, FILM COATED ORAL
Qty: 6 TABLET | Refills: 0 | Status: SHIPPED
Start: 2021-07-12 | End: 2021-01-01 | Stop reason: SDUPTHER

## 2021-07-12 RX ORDER — CIPROFLOXACIN AND DEXAMETHASONE 3; 1 MG/ML; MG/ML
4 SUSPENSION/ DROPS AURICULAR (OTIC) 2 TIMES DAILY
Qty: 7.5 ML | Refills: 0 | Status: SHIPPED | OUTPATIENT
Start: 2021-07-12 | End: 2021-01-01

## 2021-07-12 NOTE — PROGRESS NOTES
Chief Complaint:   Otitis Media (rt ear pain for a few days pain level at 8 )      History of Present Illness   Source of history provided by:  patientCourtney Delacruz is a 51 y.o. old female presenting to the walk in clinic for evaluation of right ear pain onset worsening over the last few days, unsure of any drainage and no fever. She reports she has been in a hot tub, but no swimming. She reports a mild sore throat, but no significant nasal congestion or cough. She denies any known discharge from the ear canal but reports that it feels somewhat plugged. She denies any bleeding. She has tried taking  OTC pain relievers without relief. She denies any fever, chills, CP, SOB, abdominal pain, neck stiffness, rash, or lethargy. Denies any history of international travel in the past 14 days. Unsure of any contact with any individuals with known COVID-19 infection or under investigation for COVID-19 infection. ROS    Unless otherwise stated in this report or unable to obtain because of the patient's clinical or mental status as evidenced by the medical record, this patients's positive and negative responses for Review of Systems, constitutional, psych, eyes, ENT, cardiovascular, respiratory, gastrointestinal, neurological, genitourinary, musculoskeletal, integument systems and systems related to the presenting problem are either stated in the preceding or were not pertinent or were negative for the symptoms and/or complaints related to the medical problem. Past Surgical History:  has a past surgical history that includes Dilation and curettage of uterus and Ankle surgery. Social History:  reports that she quit smoking about 3 years ago. Her smoking use included cigarettes. She started smoking about 29 years ago. She has a 7.50 pack-year smoking history. She has never used smokeless tobacco. She reports that she does not drink alcohol and does not use drugs.   Family History: family history includes Alzheimer's Disease in her father; Cancer in her father; Verenicerilyn Crystal in her father; Dementia in her father; Diabetes in her father; High Blood Pressure in her father; No Known Problems in her mother. Allergies: Penicillins    Physical Exam         VS:  BP (!) 146/83 (Site: Left Upper Arm, Position: Sitting, Cuff Size: Large Adult)   Pulse 65   Temp 97.4 °F (36.3 °C)   Resp 16   Ht 5' 1\" (1.549 m)   Wt 179 lb 9.6 oz (81.5 kg)   SpO2 99%   BMI 33.94 kg/m²    Oxygen Saturation Interpretation: Normal.    Constitutional:  Alert, development consistent with age. Non toxic  Ears:  External Ears: Normal pinna bilaterally. TM's & External Canals: Right TM unable to be visualized due to right external ear canal swelling, local tenderness to right tragus. Some dried yellow tinged crust noted to outer edge of right external ear canal. No posterior right ear/mastoid swelling, erythema or crepitance. Left TM well visualized and appears normal, no left external canal swelling, discharge or redness. Nose:  No significant nasal congestion or turbinate swelling  Throat:  Posterior pharynx with mild injection and some clear post nasal drip. Airway patient. Neck:  Normal ROM. Supple. Some shotty anterior cervical adenopathy, right >left. Respiratory:  Clear to ausculation bilaterally without wheezing, rales, or rhonchi  CV: Regular rate and rhythm, normal heart sounds  Skin:  Moist and warm without rashes or lesions. Lymphatic: No lymphangitis or adenopathy noted. Neurological:  Oriented. Motor functions intact. Assessment / [place] Up was seen today for otitis media.     Diagnoses and all orders for this visit:    Acute otitis externa of right ear, unspecified type  -     ciprofloxacin-dexamethasone (CIPRODEX) 0.3-0.1 % otic suspension; Place 4 drops into the right ear 2 times daily for 7 days    Upper respiratory tract infection, unspecified type  -     azithromycin (ZITHROMAX) 250 MG tablet; 500mg on day 1 followed by 250mg on days 2 - 5        Disposition:  Disposition: Discharge to home    Patient advised of exam findings and advised that exam consistent with right otitis externa. She is close to needing ear wick placement, but still has small area of opening for patient to place ear drops to treat infection. The patient will place Ciprodex 4 drops BID for 7 days and Rx for Zithromax given as well. Recommend to keep her upcoming follow up with her PCP and if she is having problems placing the ear drops to return and I can place ear wick. She may need to see ENT if not improving. Additional symptomatic relief discussed. F/u PCP as scheduled. if symptoms persist. ED sooner if symptoms worsen or change. ED immediately with fever, severe/worsening ear pain, mastoid redness/tenderness, CP, dyspnea, or dysphagia. Pt is in agreement with this care plan. All questions answered.

## 2021-07-12 NOTE — PATIENT INSTRUCTIONS
Patient Education        Swimmer's Ear: Care Instructions  Your Care Instructions     Swimmer's ear (otitis externa) is inflammation or infection of the ear canal. This is the passage that leads from the outer ear to the eardrum. Any water, sand, or other debris that gets into the ear canal and stays there can cause swimmer's ear. Putting cotton swabs or other items in the ear to clean it can also cause this problem. Swimmer's ear can be very painful. But you can treat the pain and infection with medicines. You should feel better in a few days. Follow-up care is a key part of your treatment and safety. Be sure to make and go to all appointments, and call your doctor if you are having problems. It's also a good idea to know your test results and keep a list of the medicines you take. How can you care for yourself at home? Cleaning and care  · Use antibiotic drops as your doctor directs. · Do not insert ear drops (other than the antibiotic ear drops) or anything else into the ear unless your doctor has told you to. · Avoid getting water in the ear until the problem clears up. Use cotton lightly coated with petroleum jelly as an earplug. Do not use plastic earplugs. · Use a hair dryer set on low to carefully dry the ear after you shower. · To ease ear pain, hold a warm washcloth against your ear. · Take pain medicines exactly as directed. ? If the doctor gave you a prescription medicine for pain, take it as prescribed. ? If you are not taking a prescription pain medicine, ask your doctor if you can take an over-the-counter medicine. Inserting ear drops  · Warm the drops to body temperature by rolling the container in your hands. Or you can place it in a cup of warm water for a few minutes. · Lie down, with your ear facing up. · Place drops inside the ear. Follow your doctor's instructions (or the directions on the label) for how many drops to use.  Gently wiggle the outer ear or pull the ear up and back to help the drops get into the ear. · It's important to keep the liquid in the ear canal for 3 to 5 minutes. When should you call for help? Call your doctor now or seek immediate medical care if:    · You have a new or higher fever.     · You have new or worse pain, swelling, warmth, or redness around or behind your ear.     · You have new or increasing pus or blood draining from your ear. Watch closely for changes in your health, and be sure to contact your doctor if:    · You are not getting better after 2 days (48 hours). Where can you learn more? Go to https://chpepiceweb.[employer]. org and sign in to your ClientShow account. Enter C706 in the Gesplan box to learn more about \"Swimmer's Ear: Care Instructions. \"     If you do not have an account, please click on the \"Sign Up Now\" link. Current as of: December 2, 2020               Content Version: 12.9  © 2006-2021 [employer]. Care instructions adapted under license by [facility] ([facility]). If you have questions about a medical condition or this instruction, always ask your healthcare professional. [place] [address] any warranty or liability for your use of this information. Patient Education        Upper Respiratory Infection (Cold): Care Instructions  Your Care Instructions     An upper respiratory infection, or URI, is an infection of the nose, sinuses, or throat. URIs are spread by coughs, sneezes, and direct contact. The common cold is the most frequent kind of URI. The flu and sinus infections are other kinds of URIs. Almost all URIs are caused by viruses. Antibiotics won't cure them. But you can treat most infections with home care. This may include drinking lots of fluids and taking over-the-counter pain medicine. You will probably feel better in 4 to 10 days. The doctor has checked you carefully, but problems can develop later.  If you notice any problems or new symptoms, get medical treatment right away. Follow-up care is a key part of your treatment and safety. Be sure to make and go to all appointments, and call your doctor if you are having problems. It's also a good idea to know your test results and keep a list of the medicines you take. How can you care for yourself at home? · To prevent dehydration, drink plenty of fluids. Choose water and other caffeine-free clear liquids until you feel better. If you have kidney, heart, or liver disease and have to limit fluids, talk with your doctor before you increase the amount of fluids you drink. · Take an over-the-counter pain medicine, such as acetaminophen (Tylenol), ibuprofen (Advil, Motrin), or naproxen (Aleve). Read and follow all instructions on the label. · Before you use cough and cold medicines, check the label. These medicines may not be safe for young children or for people with certain health problems. · Be careful when taking over-the-counter cold or flu medicines and Tylenol at the same time. Many of these medicines have acetaminophen, which is Tylenol. Read the labels to make sure that you are not taking more than the recommended dose. Too much acetaminophen (Tylenol) can be harmful. · Get plenty of rest.  · Do not smoke or allow others to smoke around you. If you need help quitting, talk to your doctor about stop-smoking programs and medicines. These can increase your chances of quitting for good. When should you call for help? Call 911 anytime you think you may need emergency care. For example, call if:    · You have severe trouble breathing. Call your doctor now or seek immediate medical care if:    · You seem to be getting much sicker.     · You have new or worse trouble breathing.     · You have a new or higher fever.     · You have a new rash.    Watch closely for changes in your health, and be sure to contact your doctor if:    · You have a new symptom, such as a sore throat, an earache, or sinus pain.     · You cough more deeply or more often, especially if you notice more mucus or a change in the color of your mucus.     · You do not get better as expected. Where can you learn more? Go to https://[employer] FitnesspeTraffio.[employer]. org and sign in to your Rewardli account. Enter E427 in the Kicksend box to learn more about \"Upper Respiratory Infection (Cold): Care Instructions. \"     If you do not have an account, please click on the \"Sign Up Now\" link. Current as of: October 26, 2020               Content Version: 12.9  © 3864-0090 [employer]. Care instructions adapted under license by [facility] ([facility]). If you have questions about a medical condition or this instruction, always ask your healthcare professional. [address] any warranty or liability for your use of this information.

## 2021-07-12 NOTE — TELEPHONE ENCOUNTER
----- Message from Luc Lam MD sent at 7/8/2021  4:43 PM EDT -----  Adv for high Potassium diet ( OJ/Babnana ) and f/u next week as scheduled     Called patient and left her a message to drink plenty of OJ and eat bananas for her potassium

## 2021-07-14 NOTE — PROGRESS NOTES
Chief Complaint   Patient presents with    Discuss Labs    Otitis Media     Patient went to Walk In on Monday fro Ear pain was prescribed a Z-Pack patient states that she continues to have pain and wouold like for you to look at right ear     Other     Patient will schedule with the mammovan for her mammogram and also gave her kit for FIT        HPI:  Patient is here for follow-up    Pt was treated at Walk in for otitis externa    On Z pack and Ciprodex     Marginal improvement         Allergy and Medications are reviewed and updated. Past Medical History, Surgical History, and Family History has been reviewed and updated. Review of Systems:  Review of Systems   Constitutional: Negative for chills and fever. HENT: Positive for ear pain. Negative for congestion, sinus pain and sore throat. Eyes: Negative for pain and discharge. Respiratory: Negative for shortness of breath (No new SOb). Cardiovascular: Negative for chest pain. Gastrointestinal: Negative for abdominal pain, blood in stool and nausea. Genitourinary: Negative for flank pain and frequency. Musculoskeletal: Negative for neck pain. Hematological: Does not bruise/bleed easily. Psychiatric/Behavioral: Negative for suicidal ideas. Vitals:    07/14/21 1455   BP: 116/74   Pulse: 86   Resp: 16   Temp: 97.5 °F (36.4 °C)   TempSrc: Temporal   SpO2: 98%   Weight: 179 lb 1.6 oz (81.2 kg)   Height: 5' 1\" (1.549 m)       Physical Exam  Vitals reviewed. Constitutional:       Appearance: She is well-developed. HENT:      Head: Normocephalic and atraumatic. Ears:      Comments: Rt ear canal- Inflamed, tender       Mouth/Throat:      Pharynx: No oropharyngeal exudate. Eyes:      Conjunctiva/sclera: Conjunctivae normal.      Pupils: Pupils are equal, round, and reactive to light. Neck:      Vascular: No JVD. Cardiovascular:      Rate and Rhythm: Normal rate and regular rhythm.    Pulmonary:      Effort: Pulmonary effort is normal.      Breath sounds: Normal breath sounds. No rales. Abdominal:      General: Bowel sounds are normal.      Palpations: Abdomen is soft. Musculoskeletal:         General: Normal range of motion. Lymphadenopathy:      Cervical: No cervical adenopathy. Skin:     General: Skin is warm and dry. Neurological:      Mental Status: She is alert and oriented to person, place, and time. Psychiatric:         Behavior: Behavior normal.          Labs :    Lab Results   Component Value Date    WBC 9.6 07/08/2021    HGB 13.0 07/08/2021    HCT 42.6 07/08/2021     07/08/2021    HDL 42 09/11/2020    ALT 10 07/08/2021    AST 15 07/08/2021     07/08/2021    K 3.4 (L) 07/08/2021     07/08/2021    CREATININE 1.0 07/08/2021    BUN 8 07/08/2021    CO2 25 07/08/2021    TSH 2.050 07/08/2021    GLUF 109 (H) 07/08/2021     Lab Results   Component Value Date    COLORU Yellow 09/11/2020    NITRU Negative 09/11/2020    GLUCOSEU Negative 09/11/2020    KETUA Negative 09/11/2020    UROBILINOGEN 0.2 09/11/2020    BILIRUBINUR Negative 09/11/2020           ASSESSMENT   There are no problems to display for this patient. Diagnosis:     ICD-10-CM    1. Acute otitis externa of right ear, unspecified type  H60.501 azithromycin (ZITHROMAX) 250 MG tablet   2. Colon cancer screening  Z12.11 POCT Fit Test   3. Low blood potassium  E87.6 POTASSIUM   4. Chronic pain in right foot  M79.671     G89.29    5. Microcytic anemia - Better, has been ref to GI  D50.9        PLAN:      Cont current treatment    RF Z pack      Labs from 7/8/21 - reviewed  CBC  CMP  TSH      Rpt K - next week, ( Banana / OJ meantime)    Reminded to f/u with GI    Pt is stable on current medical treatment. Continue current treatment plan    Side effects/Adverse effects/Precautions are reviewed with patient.      Low salt, Low Carb diet an low fat diet  Continue medications as advised and take them regularly  Regular exercises as advised    Discussed natural and expected course of this diagnosis and need to alert me if symptoms do not follow expected course, or if any worse. Smoking cessation if applicable, discussed with patient. Patient Instructions   The medication list included in this document is our record of what you are currently taking, including any changes that were made at today's visit.  If you find any differences when compared to your medications at home, or have any questions that were not answered at your visit, please contact the office. Advise to have ( Fasting) lab test prior to next visit. Return in about 2 months (around 9/14/2021).

## 2022-01-01 ENCOUNTER — TELEPHONE (OUTPATIENT)
Dept: CASE MANAGEMENT | Age: 47
End: 2022-01-01

## 2022-01-01 ENCOUNTER — OFFICE VISIT (OUTPATIENT)
Dept: BREAST CENTER | Age: 47
End: 2022-01-01
Payer: COMMERCIAL

## 2022-01-01 ENCOUNTER — APPOINTMENT (OUTPATIENT)
Dept: GENERAL RADIOLOGY | Age: 47
DRG: 871 | End: 2022-01-01
Payer: COMMERCIAL

## 2022-01-01 ENCOUNTER — TELEPHONE (OUTPATIENT)
Dept: FAMILY MEDICINE CLINIC | Age: 47
End: 2022-01-01

## 2022-01-01 ENCOUNTER — HOSPITAL ENCOUNTER (OUTPATIENT)
Dept: GENERAL RADIOLOGY | Age: 47
Discharge: HOME | End: 2022-01-08
Payer: COMMERCIAL

## 2022-01-01 ENCOUNTER — APPOINTMENT (OUTPATIENT)
Dept: GENERAL RADIOLOGY | Age: 47
End: 2022-01-01
Attending: SURGERY
Payer: COMMERCIAL

## 2022-01-01 ENCOUNTER — HOSPITAL ENCOUNTER (OUTPATIENT)
Dept: CT IMAGING | Age: 47
Discharge: HOME | End: 2022-01-28
Payer: COMMERCIAL

## 2022-01-01 ENCOUNTER — APPOINTMENT (OUTPATIENT)
Dept: ULTRASOUND IMAGING | Age: 47
DRG: 871 | End: 2022-01-01
Payer: COMMERCIAL

## 2022-01-01 ENCOUNTER — ANESTHESIA EVENT (OUTPATIENT)
Dept: OPERATING ROOM | Age: 47
End: 2022-01-01
Payer: COMMERCIAL

## 2022-01-01 ENCOUNTER — TELEPHONE (OUTPATIENT)
Dept: SURGERY | Age: 47
End: 2022-01-01

## 2022-01-01 ENCOUNTER — APPOINTMENT (OUTPATIENT)
Dept: CT IMAGING | Age: 47
DRG: 871 | End: 2022-01-01
Payer: COMMERCIAL

## 2022-01-01 ENCOUNTER — HOSPITAL ENCOUNTER (OUTPATIENT)
Dept: NUCLEAR MEDICINE | Age: 47
Discharge: HOME | End: 2022-01-28
Payer: COMMERCIAL

## 2022-01-01 ENCOUNTER — TELEPHONE (OUTPATIENT)
Dept: BREAST CENTER | Age: 47
End: 2022-01-01

## 2022-01-01 ENCOUNTER — ANESTHESIA (OUTPATIENT)
Dept: OPERATING ROOM | Age: 47
End: 2022-01-01
Payer: COMMERCIAL

## 2022-01-01 ENCOUNTER — HOSPITAL ENCOUNTER (INPATIENT)
Age: 47
LOS: 1 days | DRG: 871 | End: 2022-07-13
Attending: EMERGENCY MEDICINE | Admitting: INTERNAL MEDICINE
Payer: COMMERCIAL

## 2022-01-01 ENCOUNTER — HOSPITAL ENCOUNTER (OUTPATIENT)
Age: 47
Setting detail: OUTPATIENT SURGERY
Discharge: HOME | End: 2022-01-26
Attending: SURGERY | Admitting: SURGERY
Payer: COMMERCIAL

## 2022-01-01 ENCOUNTER — HOSPITAL ENCOUNTER (OUTPATIENT)
Dept: GENERAL RADIOLOGY | Age: 47
Discharge: HOME | End: 2022-01-28
Attending: SURGERY
Payer: COMMERCIAL

## 2022-01-01 ENCOUNTER — HOSPITAL ENCOUNTER (OUTPATIENT)
Dept: MRI IMAGING | Age: 47
Discharge: HOME | End: 2022-06-20
Payer: COMMERCIAL

## 2022-01-01 ENCOUNTER — TELEPHONE (OUTPATIENT)
Dept: PALLATIVE CARE | Age: 47
End: 2022-01-01

## 2022-01-01 ENCOUNTER — TELEPHONE (OUTPATIENT)
Dept: GENERAL RADIOLOGY | Age: 47
End: 2022-01-01

## 2022-01-01 ENCOUNTER — HOSPITAL ENCOUNTER (OUTPATIENT)
Age: 47
Discharge: HOME | End: 2022-01-23

## 2022-01-01 ENCOUNTER — OFFICE VISIT (OUTPATIENT)
Dept: FAMILY MEDICINE CLINIC | Age: 47
End: 2022-01-01
Payer: COMMERCIAL

## 2022-01-01 ENCOUNTER — TELEMEDICINE (OUTPATIENT)
Dept: FAMILY MEDICINE CLINIC | Age: 47
End: 2022-01-01
Payer: COMMERCIAL

## 2022-01-01 ENCOUNTER — PREP FOR PROCEDURE (OUTPATIENT)
Dept: SURGERY | Age: 47
End: 2022-01-01

## 2022-01-01 ENCOUNTER — APPOINTMENT (OUTPATIENT)
Dept: INTERVENTIONAL RADIOLOGY/VASCULAR | Age: 47
DRG: 871 | End: 2022-01-01
Payer: COMMERCIAL

## 2022-01-01 ENCOUNTER — HOSPITAL ENCOUNTER (OUTPATIENT)
Dept: NON INVASIVE DIAGNOSTICS | Age: 47
Discharge: HOME | End: 2022-02-02
Payer: COMMERCIAL

## 2022-01-01 VITALS
DIASTOLIC BLOOD PRESSURE: 80 MMHG | HEIGHT: 61 IN | TEMPERATURE: 97.5 F | HEART RATE: 94 BPM | BODY MASS INDEX: 32.1 KG/M2 | WEIGHT: 170 LBS | OXYGEN SATURATION: 98 % | SYSTOLIC BLOOD PRESSURE: 122 MMHG

## 2022-01-01 VITALS
SYSTOLIC BLOOD PRESSURE: 121 MMHG | RESPIRATION RATE: 17 BRPM | DIASTOLIC BLOOD PRESSURE: 76 MMHG | OXYGEN SATURATION: 98 %

## 2022-01-01 VITALS
OXYGEN SATURATION: 98 % | TEMPERATURE: 98.2 F | BODY MASS INDEX: 32.47 KG/M2 | DIASTOLIC BLOOD PRESSURE: 84 MMHG | SYSTOLIC BLOOD PRESSURE: 142 MMHG | RESPIRATION RATE: 12 BRPM | WEIGHT: 172 LBS | HEART RATE: 72 BPM | HEIGHT: 61 IN

## 2022-01-01 VITALS
SYSTOLIC BLOOD PRESSURE: 114 MMHG | TEMPERATURE: 97.4 F | BODY MASS INDEX: 32.47 KG/M2 | HEIGHT: 61 IN | WEIGHT: 172 LBS | HEART RATE: 62 BPM | RESPIRATION RATE: 16 BRPM | DIASTOLIC BLOOD PRESSURE: 66 MMHG | OXYGEN SATURATION: 96 %

## 2022-01-01 VITALS
OXYGEN SATURATION: 98 % | BODY MASS INDEX: 32.85 KG/M2 | HEART RATE: 90 BPM | DIASTOLIC BLOOD PRESSURE: 68 MMHG | SYSTOLIC BLOOD PRESSURE: 142 MMHG | TEMPERATURE: 98.4 F | HEIGHT: 61 IN | WEIGHT: 174 LBS

## 2022-01-01 VITALS
WEIGHT: 173 LBS | HEART RATE: 82 BPM | BODY MASS INDEX: 32.66 KG/M2 | SYSTOLIC BLOOD PRESSURE: 122 MMHG | RESPIRATION RATE: 16 BRPM | TEMPERATURE: 97.4 F | DIASTOLIC BLOOD PRESSURE: 80 MMHG | HEIGHT: 61 IN | OXYGEN SATURATION: 97 %

## 2022-01-01 VITALS
SYSTOLIC BLOOD PRESSURE: 43 MMHG | DIASTOLIC BLOOD PRESSURE: 20 MMHG | BODY MASS INDEX: 34.53 KG/M2 | HEIGHT: 61 IN | WEIGHT: 182.9 LBS | HEART RATE: 85 BPM | OXYGEN SATURATION: 64 % | TEMPERATURE: 95.9 F | RESPIRATION RATE: 21 BRPM

## 2022-01-01 DIAGNOSIS — C50.911 BREAST CANCER METASTASIZED TO AXILLARY LYMPH NODE, RIGHT (HCC): ICD-10-CM

## 2022-01-01 DIAGNOSIS — J06.9 UPPER RESPIRATORY TRACT INFECTION, UNSPECIFIED TYPE: Primary | ICD-10-CM

## 2022-01-01 DIAGNOSIS — U07.1 COVID-19: Primary | ICD-10-CM

## 2022-01-01 DIAGNOSIS — Z17.1 MALIGNANT NEOPLASM OF UPPER-OUTER QUADRANT OF RIGHT BREAST IN FEMALE, ESTROGEN RECEPTOR NEGATIVE (HCC): ICD-10-CM

## 2022-01-01 DIAGNOSIS — C80.1 CANCER (HCC): ICD-10-CM

## 2022-01-01 DIAGNOSIS — M79.661 PAIN AND SWELLING OF RIGHT LOWER LEG: ICD-10-CM

## 2022-01-01 DIAGNOSIS — C50.411 MALIGNANT NEOPLASM OF UPPER-OUTER QUADRANT OF RIGHT BREAST IN FEMALE, ESTROGEN RECEPTOR NEGATIVE (HCC): ICD-10-CM

## 2022-01-01 DIAGNOSIS — C50.911 BREAST CANCER METASTASIZED TO AXILLARY LYMPH NODE, RIGHT (HCC): Primary | ICD-10-CM

## 2022-01-01 DIAGNOSIS — D50.8 OTHER IRON DEFICIENCY ANEMIA: ICD-10-CM

## 2022-01-01 DIAGNOSIS — C77.3 BREAST CANCER METASTASIZED TO AXILLARY LYMPH NODE, RIGHT (HCC): Primary | ICD-10-CM

## 2022-01-01 DIAGNOSIS — R92.8 ABNORMAL MAMMOGRAM: ICD-10-CM

## 2022-01-01 DIAGNOSIS — R79.89 ELEVATED LACTIC ACID LEVEL: ICD-10-CM

## 2022-01-01 DIAGNOSIS — U07.1 COVID-19: ICD-10-CM

## 2022-01-01 DIAGNOSIS — M79.661 PAIN AND SWELLING OF RIGHT LOWER LEG: Primary | ICD-10-CM

## 2022-01-01 DIAGNOSIS — F32.A DEPRESSION, UNSPECIFIED DEPRESSION TYPE: ICD-10-CM

## 2022-01-01 DIAGNOSIS — D50.8 OTHER IRON DEFICIENCY ANEMIA: Primary | ICD-10-CM

## 2022-01-01 DIAGNOSIS — R10.11 RIGHT UPPER QUADRANT ABDOMINAL PAIN: Primary | ICD-10-CM

## 2022-01-01 DIAGNOSIS — Z17.1 MALIGNANT NEOPLASM OF UPPER-OUTER QUADRANT OF RIGHT BREAST IN FEMALE, ESTROGEN RECEPTOR NEGATIVE (HCC): Primary | ICD-10-CM

## 2022-01-01 DIAGNOSIS — C77.3 BREAST CANCER METASTASIZED TO AXILLARY LYMPH NODE, RIGHT (HCC): ICD-10-CM

## 2022-01-01 DIAGNOSIS — C50.411 MALIGNANT NEOPLASM OF UPPER-OUTER QUADRANT OF RIGHT BREAST IN FEMALE, ESTROGEN RECEPTOR NEGATIVE (HCC): Primary | ICD-10-CM

## 2022-01-01 DIAGNOSIS — C50.911 MALIGNANT NEOPLASM OF RIGHT BREAST IN FEMALE, ESTROGEN RECEPTOR NEGATIVE, UNSPECIFIED SITE OF BREAST (HCC): Primary | ICD-10-CM

## 2022-01-01 DIAGNOSIS — Z17.1 MALIGNANT NEOPLASM OF RIGHT BREAST IN FEMALE, ESTROGEN RECEPTOR NEGATIVE, UNSPECIFIED SITE OF BREAST (HCC): Primary | ICD-10-CM

## 2022-01-01 DIAGNOSIS — Z01.818 PRE-OP TESTING: ICD-10-CM

## 2022-01-01 DIAGNOSIS — M79.89 PAIN AND SWELLING OF RIGHT LOWER LEG: ICD-10-CM

## 2022-01-01 DIAGNOSIS — C50.911 MALIGNANT NEOPLASM OF RIGHT BREAST IN FEMALE, ESTROGEN RECEPTOR NEGATIVE, UNSPECIFIED SITE OF BREAST (HCC): ICD-10-CM

## 2022-01-01 DIAGNOSIS — Z17.1 MALIGNANT NEOPLASM OF RIGHT BREAST IN FEMALE, ESTROGEN RECEPTOR NEGATIVE, UNSPECIFIED SITE OF BREAST (HCC): ICD-10-CM

## 2022-01-01 DIAGNOSIS — G44.52 NEW DAILY PERSISTENT HEADACHE: ICD-10-CM

## 2022-01-01 DIAGNOSIS — R11.2 INTRACTABLE NAUSEA AND VOMITING: ICD-10-CM

## 2022-01-01 DIAGNOSIS — Z51.11 ENCOUNTER FOR ANTINEOPLASTIC CHEMOTHERAPY: ICD-10-CM

## 2022-01-01 DIAGNOSIS — M79.89 PAIN AND SWELLING OF RIGHT LOWER LEG: Primary | ICD-10-CM

## 2022-01-01 LAB
ALBUMIN SERPL-MCNC: 2.9 G/DL (ref 3.5–5.2)
ALBUMIN SERPL-MCNC: 2.9 G/DL (ref 3.5–5.2)
ALBUMIN SERPL-MCNC: 3.1 G/DL (ref 3.5–5.2)
ALBUMIN SERPL-MCNC: 3.3 G/DL (ref 3.5–5.2)
ALBUMIN SERPL-MCNC: 3.7 G/DL
ALBUMIN SERPL-MCNC: 3.9 G/DL (ref 3.5–5.2)
ALBUMIN SERPL-MCNC: 4 G/DL
ALBUMIN SERPL-MCNC: 4.3 G/DL
ALP BLD-CCNC: 102 U/L
ALP BLD-CCNC: 113 U/L (ref 35–104)
ALP BLD-CCNC: 127 U/L
ALP BLD-CCNC: 147 U/L
ALP BLD-CCNC: 468 U/L (ref 35–104)
ALP BLD-CCNC: 503 U/L (ref 35–104)
ALP BLD-CCNC: 581 U/L (ref 35–104)
ALP BLD-CCNC: 639 U/L (ref 35–104)
ALT SERPL-CCNC: 10 U/L
ALT SERPL-CCNC: 135 U/L (ref 0–32)
ALT SERPL-CCNC: 1428 U/L (ref 0–32)
ALT SERPL-CCNC: 157 U/L (ref 0–32)
ALT SERPL-CCNC: 180 U/L (ref 0–32)
ALT SERPL-CCNC: 6 U/L (ref 0–32)
ALT SERPL-CCNC: 8 U/L
ALT SERPL-CCNC: 9 U/L
ANION GAP SERPL CALCULATED.3IONS-SCNC: 1.6 MMOL/L
ANION GAP SERPL CALCULATED.3IONS-SCNC: 13 MMOL/L (ref 7–16)
ANION GAP SERPL CALCULATED.3IONS-SCNC: 24 MMOL/L (ref 7–16)
ANION GAP SERPL CALCULATED.3IONS-SCNC: 31 MMOL/L (ref 7–16)
ANION GAP SERPL CALCULATED.3IONS-SCNC: 40 MMOL/L (ref 7–16)
ANION GAP SERPL CALCULATED.3IONS-SCNC: NORMAL MMOL/L
ANION GAP SERPL CALCULATED.3IONS-SCNC: NORMAL MMOL/L
ANISOCYTOSIS: ABNORMAL
APTT: 26.1 SEC (ref 24.5–35.1)
AST SERPL-CCNC: 12 U/L (ref 0–31)
AST SERPL-CCNC: 13 U/L
AST SERPL-CCNC: 14 U/L
AST SERPL-CCNC: 20 U/L
AST SERPL-CCNC: 247 U/L (ref 0–31)
AST SERPL-CCNC: 321 U/L (ref 0–31)
AST SERPL-CCNC: 400 U/L (ref 0–31)
AST SERPL-CCNC: 4586 U/L (ref 0–31)
B.E.: -18 MMOL/L (ref -3–3)
BACTERIA: ABNORMAL /HPF
BASOPHILS ABSOLUTE: 0 /ΜL
BASOPHILS ABSOLUTE: 0 /ΜL
BASOPHILS ABSOLUTE: 0 E9/L (ref 0–0.2)
BASOPHILS ABSOLUTE: 0.06 E9/L (ref 0–0.2)
BASOPHILS ABSOLUTE: 71 /ΜL
BASOPHILS RELATIVE PERCENT: 0 %
BASOPHILS RELATIVE PERCENT: 0.3 % (ref 0–2)
BASOPHILS RELATIVE PERCENT: 0.3 % (ref 0–2)
BASOPHILS RELATIVE PERCENT: 0.4 % (ref 0–2)
BASOPHILS RELATIVE PERCENT: 0.5 % (ref 0–2)
BASOPHILS RELATIVE PERCENT: 0.7 %
BASOPHILS RELATIVE PERCENT: 9 %
BILIRUB SERPL-MCNC: 0.3 MG/DL (ref 0.1–1.4)
BILIRUB SERPL-MCNC: 4.4 MG/DL (ref 0–1.2)
BILIRUB SERPL-MCNC: 4.6 MG/DL (ref 0–1.2)
BILIRUB SERPL-MCNC: 5 MG/DL (ref 0–1.2)
BILIRUB SERPL-MCNC: 6.8 MG/DL (ref 0–1.2)
BILIRUB SERPL-MCNC: <0.2 MG/DL (ref 0–1.2)
BILIRUBIN DIRECT: 4.1 MG/DL (ref 0–0.3)
BILIRUBIN URINE: ABNORMAL
BILIRUBIN, INDIRECT: 0.3 MG/DL (ref 0–1)
BLOOD, URINE: NEGATIVE
BUN BLDV-MCNC: 11 MG/DL
BUN BLDV-MCNC: 14 MG/DL
BUN BLDV-MCNC: 16 MG/DL
BUN BLDV-MCNC: 16 MG/DL (ref 6–20)
BUN BLDV-MCNC: 22 MG/DL (ref 6–20)
BUN BLDV-MCNC: 25 MG/DL (ref 6–20)
BUN BLDV-MCNC: 48 MG/DL (ref 6–20)
BURR CELLS: ABNORMAL
CALCIUM SERPL-MCNC: 10.6 MG/DL (ref 8.6–10.2)
CALCIUM SERPL-MCNC: 8.6 MG/DL
CALCIUM SERPL-MCNC: 9 MG/DL
CALCIUM SERPL-MCNC: 9.1 MG/DL (ref 8.6–10.2)
CALCIUM SERPL-MCNC: 9.2 MG/DL
CALCIUM SERPL-MCNC: 9.2 MG/DL (ref 8.6–10.2)
CALCIUM SERPL-MCNC: 9.4 MG/DL (ref 8.6–10.2)
CHLORIDE BLD-SCNC: 102 MMOL/L
CHLORIDE BLD-SCNC: 103 MMOL/L
CHLORIDE BLD-SCNC: 103 MMOL/L
CHLORIDE BLD-SCNC: 105 MMOL/L (ref 98–107)
CHLORIDE BLD-SCNC: 87 MMOL/L (ref 98–107)
CHLORIDE BLD-SCNC: 92 MMOL/L (ref 98–107)
CHLORIDE BLD-SCNC: 98 MMOL/L (ref 98–107)
CHLORIDE URINE RANDOM: <20 MMOL/L
CLARITY: ABNORMAL
CO2: 12 MMOL/L (ref 22–29)
CO2: 14 MMOL/L (ref 22–29)
CO2: 23 MMOL/L
CO2: 23 MMOL/L (ref 22–29)
CO2: 26 MMOL/L
CO2: 28 MMOL/L
CO2: 7 MMOL/L (ref 22–29)
COHB: 0.3 % (ref 0–1.5)
COLOR: YELLOW
COMMENT: ABNORMAL
CREAT SERPL-MCNC: 0.77 MG/DL
CREAT SERPL-MCNC: 0.88 MG/DL
CREAT SERPL-MCNC: 0.9 MG/DL (ref 0.5–1)
CREAT SERPL-MCNC: 1.04 MG/DL
CREAT SERPL-MCNC: 1.2 MG/DL (ref 0.5–1)
CREAT SERPL-MCNC: 1.3 MG/DL (ref 0.5–1)
CREAT SERPL-MCNC: 2.7 MG/DL (ref 0.5–1)
CRITICAL: ABNORMAL
DATE ANALYZED: ABNORMAL
DATE OF COLLECTION: ABNORMAL
EKG ATRIAL RATE: 125 BPM
EKG P AXIS: 32 DEGREES
EKG P-R INTERVAL: 124 MS
EKG Q-T INTERVAL: 320 MS
EKG QRS DURATION: 74 MS
EKG QTC CALCULATION (BAZETT): 461 MS
EKG R AXIS: 33 DEGREES
EKG T AXIS: 24 DEGREES
EKG VENTRICULAR RATE: 125 BPM
EOSINOPHILS ABSOLUTE: 0 /ΜL
EOSINOPHILS ABSOLUTE: 0 /ΜL
EOSINOPHILS ABSOLUTE: 0 E9/L (ref 0.05–0.5)
EOSINOPHILS ABSOLUTE: 0.26 E9/L (ref 0.05–0.5)
EOSINOPHILS ABSOLUTE: 232 /ΜL
EOSINOPHILS RELATIVE PERCENT: 0 %
EOSINOPHILS RELATIVE PERCENT: 0 %
EOSINOPHILS RELATIVE PERCENT: 0 % (ref 0–6)
EOSINOPHILS RELATIVE PERCENT: 1.7 % (ref 0–6)
EOSINOPHILS RELATIVE PERCENT: 2.3 %
EOSINOPHILS RELATIVE PERCENT: 2.6 % (ref 0–6)
EOSINOPHILS RELATIVE PERCENT: 5.2 % (ref 0–6)
EPITHELIAL CELLS, UA: ABNORMAL /HPF
GFR AFRICAN AMERICAN: 23
GFR AFRICAN AMERICAN: 53
GFR AFRICAN AMERICAN: 58
GFR AFRICAN AMERICAN: >60
GFR CALCULATED: 63
GFR CALCULATED: 78
GFR CALCULATED: 91
GFR NON-AFRICAN AMERICAN: 19 ML/MIN/1.73
GFR NON-AFRICAN AMERICAN: 44 ML/MIN/1.73
GFR NON-AFRICAN AMERICAN: 48 ML/MIN/1.73
GFR NON-AFRICAN AMERICAN: >60 ML/MIN/1.73
GLUCOSE BLD-MCNC: 111 MG/DL (ref 74–99)
GLUCOSE BLD-MCNC: 114 MG/DL (ref 74–99)
GLUCOSE BLD-MCNC: 119 MG/DL (ref 74–99)
GLUCOSE BLD-MCNC: 150 MG/DL
GLUCOSE BLD-MCNC: 174 MG/DL
GLUCOSE BLD-MCNC: 254 MG/DL
GLUCOSE BLD-MCNC: 71 MG/DL (ref 74–99)
GLUCOSE URINE: NEGATIVE MG/DL
HCG, URINE, POC: NEGATIVE
HCO3: 7.4 MMOL/L (ref 22–26)
HCT VFR BLD CALC: 34.2 % (ref 36–46)
HCT VFR BLD CALC: 37 % (ref 36–46)
HCT VFR BLD CALC: 37.4 % (ref 34–48)
HCT VFR BLD CALC: 38.4 % (ref 36–46)
HCT VFR BLD CALC: 39.5 % (ref 34–48)
HCT VFR BLD CALC: 43.2 % (ref 34–48)
HCT VFR BLD CALC: 44.7 % (ref 34–48)
HEMOGLOBIN: 10.3 G/DL (ref 12–16)
HEMOGLOBIN: 10.8 G/DL (ref 11.5–15.5)
HEMOGLOBIN: 11.4 G/DL (ref 12–16)
HEMOGLOBIN: 12.1 G/DL (ref 12–16)
HEMOGLOBIN: 12.5 G/DL (ref 11.5–15.5)
HEMOGLOBIN: 12.7 G/DL (ref 11.5–15.5)
HEMOGLOBIN: 14.2 G/DL (ref 11.5–15.5)
HHB: 6.2 % (ref 0–5)
HYALINE CASTS: ABNORMAL /LPF (ref 0–2)
HYPOCHROMIA: ABNORMAL
IMMATURE GRANULOCYTES #: 0.34 E9/L
IMMATURE GRANULOCYTES %: 2.1 % (ref 0–5)
INR BLD: 1.6
INR BLD: 3.3
KETONES, URINE: 15 MG/DL
LAB: ABNORMAL
LACTIC ACID: 12.8 MMOL/L (ref 0.5–2.2)
LACTIC ACID: 20.3 MMOL/L (ref 0.5–2.2)
LACTIC ACID: 7.6 MMOL/L (ref 0.5–2.2)
LACTIC ACID: 8.5 MMOL/L (ref 0.5–2.2)
LEUKOCYTE ESTERASE, URINE: ABNORMAL
LIPASE: 11 U/L (ref 13–60)
LV EF: 74 %
LVEF MODALITY: NORMAL
LYMPHOCYTES ABSOLUTE: 0.28 E9/L (ref 1.5–4)
LYMPHOCYTES ABSOLUTE: 0.52 E9/L (ref 1.5–4)
LYMPHOCYTES ABSOLUTE: 0.58 E9/L (ref 1.5–4)
LYMPHOCYTES ABSOLUTE: 1.11 E9/L (ref 1.5–4)
LYMPHOCYTES ABSOLUTE: 116 /ΜL
LYMPHOCYTES ABSOLUTE: 1232 /ΜL
LYMPHOCYTES ABSOLUTE: 935 /ΜL
LYMPHOCYTES RELATIVE PERCENT: 1 %
LYMPHOCYTES RELATIVE PERCENT: 1.8 % (ref 20–42)
LYMPHOCYTES RELATIVE PERCENT: 11.4 % (ref 20–42)
LYMPHOCYTES RELATIVE PERCENT: 12.2 %
LYMPHOCYTES RELATIVE PERCENT: 17 %
LYMPHOCYTES RELATIVE PERCENT: 2.6 % (ref 20–42)
LYMPHOCYTES RELATIVE PERCENT: 3.6 % (ref 20–42)
Lab: ABNORMAL
Lab: NORMAL
MAGNESIUM: 3.1 MG/DL (ref 1.6–2.6)
MCH RBC QN AUTO: 20.7 PG (ref 26–35)
MCH RBC QN AUTO: 21.4 PG
MCH RBC QN AUTO: 24.7 PG
MCH RBC QN AUTO: 25.9 PG
MCH RBC QN AUTO: 30.9 PG (ref 26–35)
MCH RBC QN AUTO: 31.2 PG (ref 26–35)
MCH RBC QN AUTO: 31.3 PG (ref 26–35)
MCHC RBC AUTO-ENTMCNC: 28.9 % (ref 32–34.5)
MCHC RBC AUTO-ENTMCNC: 29.4 % (ref 32–34.5)
MCHC RBC AUTO-ENTMCNC: 30.1 G/DL
MCHC RBC AUTO-ENTMCNC: 30.8 G/DL
MCHC RBC AUTO-ENTMCNC: 31.5 G/DL
MCHC RBC AUTO-ENTMCNC: 31.6 % (ref 32–34.5)
MCHC RBC AUTO-ENTMCNC: 31.8 % (ref 32–34.5)
MCV RBC AUTO: 105.1 FL (ref 80–99.9)
MCV RBC AUTO: 71.1 FL
MCV RBC AUTO: 71.8 FL (ref 80–99.9)
MCV RBC AUTO: 80.1 FL
MCV RBC AUTO: 82.1 FL
MCV RBC AUTO: 98.5 FL (ref 80–99.9)
MCV RBC AUTO: 98.5 FL (ref 80–99.9)
METAMYELOCYTES RELATIVE PERCENT: 1.8 % (ref 0–1)
METER GLUCOSE: 60 MG/DL (ref 74–99)
METER GLUCOSE: 83 MG/DL (ref 74–99)
METHB: 0.5 % (ref 0–1.5)
MODE: ABNORMAL
MONOCYTES ABSOLUTE: 0 /ΜL
MONOCYTES ABSOLUTE: 0.4 E9/L (ref 0.1–0.95)
MONOCYTES ABSOLUTE: 1.04 E9/L (ref 0.1–0.95)
MONOCYTES ABSOLUTE: 1.69 E9/L (ref 0.1–0.95)
MONOCYTES ABSOLUTE: 1.89 E9/L (ref 0.1–0.95)
MONOCYTES ABSOLUTE: 495 /ΜL
MONOCYTES ABSOLUTE: 606 /ΜL
MONOCYTES RELATIVE PERCENT: 0 %
MONOCYTES RELATIVE PERCENT: 11.5 % (ref 2–12)
MONOCYTES RELATIVE PERCENT: 11.6 % (ref 2–12)
MONOCYTES RELATIVE PERCENT: 3.5 % (ref 2–12)
MONOCYTES RELATIVE PERCENT: 6 %
MONOCYTES RELATIVE PERCENT: 6.1 % (ref 2–12)
MONOCYTES RELATIVE PERCENT: 9 %
MRSA CULTURE ONLY: NORMAL
MYELOCYTE PERCENT: 0.9 % (ref 0–0)
NEGATIVE QC PASS/FAIL: NORMAL
NEUTROPHILS ABSOLUTE: 12.27 E9/L (ref 1.8–7.3)
NEUTROPHILS ABSOLUTE: 13.37 E9/L (ref 1.8–7.3)
NEUTROPHILS ABSOLUTE: 15.74 E9/L (ref 1.8–7.3)
NEUTROPHILS ABSOLUTE: 3410 /ΜL
NEUTROPHILS ABSOLUTE: 7959 /ΜL
NEUTROPHILS ABSOLUTE: 8.38 E9/L (ref 1.8–7.3)
NEUTROPHILS ABSOLUTE: 9860 /ΜL
NEUTROPHILS RELATIVE PERCENT: 62 %
NEUTROPHILS RELATIVE PERCENT: 78.8 %
NEUTROPHILS RELATIVE PERCENT: 82.3 % (ref 43–80)
NEUTROPHILS RELATIVE PERCENT: 82.5 % (ref 43–80)
NEUTROPHILS RELATIVE PERCENT: 85 %
NEUTROPHILS RELATIVE PERCENT: 85.8 % (ref 43–80)
NEUTROPHILS RELATIVE PERCENT: 89.5 % (ref 43–80)
NITRITE, URINE: NEGATIVE
NUCLEATED RED BLOOD CELLS: 0.9 /100 WBC
NUCLEATED RED BLOOD CELLS: 2.6 /100 WBC
O2 CONTENT: 18.2 ML/DL
O2 SATURATION: 93.8 % (ref 92–98.5)
O2HB: 93 % (ref 94–97)
OPERATOR ID: 301
OVALOCYTES: ABNORMAL
PATIENT TEMP: 37 C
PCO2: 18.7 MMHG (ref 35–45)
PDW BLD-RTO: 16 FL (ref 11.5–15)
PDW BLD-RTO: 16.3 FL (ref 11.5–15)
PDW BLD-RTO: 16.5 FL (ref 11.5–15)
PDW BLD-RTO: 16.7 %
PDW BLD-RTO: 16.7 FL (ref 11.5–15)
PDW BLD-RTO: 26.2 %
PDW BLD-RTO: 26.4 %
PH BLOOD GAS: 7.22 (ref 7.35–7.45)
PH UA: 5 (ref 5–9)
PHOSPHORUS: 7.3 MG/DL (ref 2.5–4.5)
PLATELET # BLD: 200 K/ΜL
PLATELET # BLD: 218 K/ΜL
PLATELET # BLD: 250 K/ΜL
PLATELET # BLD: 258 E9/L (ref 130–450)
PLATELET # BLD: 285 E9/L (ref 130–450)
PLATELET # BLD: 376 E9/L (ref 130–450)
PLATELET # BLD: 379 E9/L (ref 130–450)
PMV BLD AUTO: 10 FL
PMV BLD AUTO: 10 FL (ref 7–12)
PMV BLD AUTO: 10.4 FL (ref 7–12)
PMV BLD AUTO: 10.5 FL (ref 7–12)
PMV BLD AUTO: 11 FL (ref 7–12)
PMV BLD AUTO: 9.6 FL
PMV BLD AUTO: 9.6 FL
PO2: 84.4 MMHG (ref 75–100)
POIKILOCYTES: ABNORMAL
POLYCHROMASIA: ABNORMAL
POSITIVE QC PASS/FAIL: NORMAL
POTASSIUM REFLEX MAGNESIUM: 4.8 MMOL/L (ref 3.5–5)
POTASSIUM REFLEX MAGNESIUM: 4.8 MMOL/L (ref 3.5–5)
POTASSIUM SERPL-SCNC: 3.8 MMOL/L
POTASSIUM SERPL-SCNC: 3.9 MMOL/L
POTASSIUM SERPL-SCNC: 4.1 MMOL/L
POTASSIUM SERPL-SCNC: 4.2 MMOL/L (ref 3.5–5)
POTASSIUM SERPL-SCNC: 7.3 MMOL/L (ref 3.5–5)
POTASSIUM, UR: 49.7 MMOL/L
PRO-BNP: 1137 PG/ML (ref 0–125)
PROTEIN UA: ABNORMAL MG/DL
PROTHROMBIN TIME: 18.3 SEC (ref 9.3–12.4)
PROTHROMBIN TIME: 38.1 SEC (ref 9.3–12.4)
RBC # BLD: 4.01 E12/L (ref 3.5–5.5)
RBC # BLD: 4.11 E12/L (ref 3.5–5.5)
RBC # BLD: 4.54 E12/L (ref 3.5–5.5)
RBC # BLD: 4.62 10^6/ΜL
RBC # BLD: 4.68 10^6/ΜL
RBC # BLD: 4.81 10^6/ΜL
RBC # BLD: 5.21 E12/L (ref 3.5–5.5)
RBC UA: ABNORMAL /HPF (ref 0–2)
SARS-COV-2, PCR: NOT DETECTED
SODIUM BLD-SCNC: 134 MMOL/L (ref 132–146)
SODIUM BLD-SCNC: 135 MMOL/L (ref 132–146)
SODIUM BLD-SCNC: 136 MMOL/L (ref 132–146)
SODIUM BLD-SCNC: 137 MMOL/L
SODIUM BLD-SCNC: 139 MMOL/L
SODIUM BLD-SCNC: 140 MMOL/L
SODIUM BLD-SCNC: 141 MMOL/L (ref 132–146)
SODIUM URINE: <20 MMOL/L
SOURCE, BLOOD GAS: ABNORMAL
SOURCE: NORMAL
SPECIFIC GRAVITY UA: 1.02 (ref 1–1.03)
TEAR DROP CELLS: ABNORMAL
THB: 13.9 G/DL (ref 11.5–16.5)
TIME ANALYZED: 940
TOTAL PROTEIN: 4.9 G/DL (ref 6.4–8.3)
TOTAL PROTEIN: 5 G/DL (ref 6.4–8.3)
TOTAL PROTEIN: 5 G/DL (ref 6.4–8.3)
TOTAL PROTEIN: 5.8 G/DL (ref 6.4–8.3)
TOTAL PROTEIN: 6
TOTAL PROTEIN: 6
TOTAL PROTEIN: 6.4
TOTAL PROTEIN: 6.8 G/DL (ref 6.4–8.3)
TROPONIN, HIGH SENSITIVITY: 34 NG/L (ref 0–9)
TROPONIN, HIGH SENSITIVITY: 34 NG/L (ref 0–9)
UROBILINOGEN, URINE: 1 E.U./DL
VACUOLATED NEUTROPHILS: ABNORMAL
WBC # BLD: 10.1 10^3/ML
WBC # BLD: 10.1 E9/L (ref 4.5–11.5)
WBC # BLD: 11.6 10^3/ML
WBC # BLD: 14.1 E9/L (ref 4.5–11.5)
WBC # BLD: 16.2 E9/L (ref 4.5–11.5)
WBC # BLD: 17.3 E9/L (ref 4.5–11.5)
WBC # BLD: 5.5 10^3/ML
WBC UA: ABNORMAL /HPF (ref 0–5)

## 2022-01-01 PROCEDURE — 6360000002 HC RX W HCPCS: Performed by: RADIOLOGY

## 2022-01-01 PROCEDURE — 88342 IMHCHEM/IMCYTCHM 1ST ANTB: CPT

## 2022-01-01 PROCEDURE — 3209999900 FLUORO FOR SURGICAL PROCEDURES

## 2022-01-01 PROCEDURE — 51702 INSERT TEMP BLADDER CATH: CPT

## 2022-01-01 PROCEDURE — 87081 CULTURE SCREEN ONLY: CPT

## 2022-01-01 PROCEDURE — 88305 TISSUE EXAM BY PATHOLOGIST: CPT

## 2022-01-01 PROCEDURE — 6370000000 HC RX 637 (ALT 250 FOR IP): Performed by: INTERNAL MEDICINE

## 2022-01-01 PROCEDURE — 7100000011 HC PHASE II RECOVERY - ADDTL 15 MIN: Performed by: SURGERY

## 2022-01-01 PROCEDURE — 6360000002 HC RX W HCPCS: Performed by: INTERNAL MEDICINE

## 2022-01-01 PROCEDURE — 88307 TISSUE EXAM BY PATHOLOGIST: CPT

## 2022-01-01 PROCEDURE — 99285 EMERGENCY DEPT VISIT HI MDM: CPT

## 2022-01-01 PROCEDURE — 71260 CT THORAX DX C+: CPT

## 2022-01-01 PROCEDURE — 99238 HOSP IP/OBS DSCHRG MGMT 30/<: CPT | Performed by: INTERNAL MEDICINE

## 2022-01-01 PROCEDURE — 85025 COMPLETE CBC W/AUTO DIFF WBC: CPT

## 2022-01-01 PROCEDURE — 36592 COLLECT BLOOD FROM PICC: CPT

## 2022-01-01 PROCEDURE — 96361 HYDRATE IV INFUSION ADD-ON: CPT

## 2022-01-01 PROCEDURE — 83880 ASSAY OF NATRIURETIC PEPTIDE: CPT

## 2022-01-01 PROCEDURE — 2580000003 HC RX 258: Performed by: SURGERY

## 2022-01-01 PROCEDURE — 71275 CT ANGIOGRAPHY CHEST: CPT

## 2022-01-01 PROCEDURE — 2709999900 IR BIOPSY LIVER PERCUTANEOUS

## 2022-01-01 PROCEDURE — A4648 IMPLANTABLE TISSUE MARKER: HCPCS

## 2022-01-01 PROCEDURE — 80053 COMPREHEN METABOLIC PANEL: CPT

## 2022-01-01 PROCEDURE — 47000 NEEDLE BIOPSY OF LIVER PERQ: CPT

## 2022-01-01 PROCEDURE — 78306 BONE IMAGING WHOLE BODY: CPT

## 2022-01-01 PROCEDURE — 77001 FLUOROGUIDE FOR VEIN DEVICE: CPT | Performed by: SURGERY

## 2022-01-01 PROCEDURE — 88341 IMHCHEM/IMCYTCHM EA ADD ANTB: CPT

## 2022-01-01 PROCEDURE — 2500000003 HC RX 250 WO HCPCS

## 2022-01-01 PROCEDURE — 6360000004 HC RX CONTRAST MEDICATION: Performed by: RADIOLOGY

## 2022-01-01 PROCEDURE — 2709999900 HC NON-CHARGEABLE SUPPLY: Performed by: SURGERY

## 2022-01-01 PROCEDURE — 36415 COLL VENOUS BLD VENIPUNCTURE: CPT

## 2022-01-01 PROCEDURE — 99205 OFFICE O/P NEW HI 60 MIN: CPT | Performed by: SURGERY

## 2022-01-01 PROCEDURE — 2580000003 HC RX 258: Performed by: EMERGENCY MEDICINE

## 2022-01-01 PROCEDURE — 99214 OFFICE O/P EST MOD 30 MIN: CPT | Performed by: FAMILY MEDICINE

## 2022-01-01 PROCEDURE — 81001 URINALYSIS AUTO W/SCOPE: CPT

## 2022-01-01 PROCEDURE — 84300 ASSAY OF URINE SODIUM: CPT

## 2022-01-01 PROCEDURE — 2500000003 HC RX 250 WO HCPCS: Performed by: INTERNAL MEDICINE

## 2022-01-01 PROCEDURE — 76942 ECHO GUIDE FOR BIOPSY: CPT

## 2022-01-01 PROCEDURE — 2580000003 HC RX 258: Performed by: INTERNAL MEDICINE

## 2022-01-01 PROCEDURE — 85610 PROTHROMBIN TIME: CPT

## 2022-01-01 PROCEDURE — 80076 HEPATIC FUNCTION PANEL: CPT

## 2022-01-01 PROCEDURE — 77065 DX MAMMO INCL CAD UNI: CPT

## 2022-01-01 PROCEDURE — 93356 MYOCRD STRAIN IMG SPCKL TRCK: CPT

## 2022-01-01 PROCEDURE — 77012 CT SCAN FOR NEEDLE BIOPSY: CPT

## 2022-01-01 PROCEDURE — 74177 CT ABD & PELVIS W/CONTRAST: CPT

## 2022-01-01 PROCEDURE — 99213 OFFICE O/P EST LOW 20 MIN: CPT | Performed by: SURGERY

## 2022-01-01 PROCEDURE — 7100000010 HC PHASE II RECOVERY - FIRST 15 MIN: Performed by: SURGERY

## 2022-01-01 PROCEDURE — 88360 TUMOR IMMUNOHISTOCHEM/MANUAL: CPT

## 2022-01-01 PROCEDURE — 2500000003 HC RX 250 WO HCPCS: Performed by: SURGERY

## 2022-01-01 PROCEDURE — 87040 BLOOD CULTURE FOR BACTERIA: CPT

## 2022-01-01 PROCEDURE — 6360000002 HC RX W HCPCS: Performed by: EMERGENCY MEDICINE

## 2022-01-01 PROCEDURE — 94660 CPAP INITIATION&MGMT: CPT

## 2022-01-01 PROCEDURE — G0463 HOSPITAL OUTPT CLINIC VISIT: HCPCS | Performed by: SURGERY

## 2022-01-01 PROCEDURE — 84133 ASSAY OF URINE POTASSIUM: CPT

## 2022-01-01 PROCEDURE — 83690 ASSAY OF LIPASE: CPT

## 2022-01-01 PROCEDURE — 93306 TTE W/DOPPLER COMPLETE: CPT

## 2022-01-01 PROCEDURE — 70553 MRI BRAIN STEM W/O & W/DYE: CPT

## 2022-01-01 PROCEDURE — 2000000000 HC ICU R&B

## 2022-01-01 PROCEDURE — C1788 PORT, INDWELLING, IMP: HCPCS | Performed by: SURGERY

## 2022-01-01 PROCEDURE — 84484 ASSAY OF TROPONIN QUANT: CPT

## 2022-01-01 PROCEDURE — 99203 OFFICE O/P NEW LOW 30 MIN: CPT | Performed by: SURGERY

## 2022-01-01 PROCEDURE — 6360000002 HC RX W HCPCS: Performed by: SURGERY

## 2022-01-01 PROCEDURE — 6360000002 HC RX W HCPCS: Performed by: NURSE ANESTHETIST, CERTIFIED REGISTERED

## 2022-01-01 PROCEDURE — 82962 GLUCOSE BLOOD TEST: CPT

## 2022-01-01 PROCEDURE — 71045 X-RAY EXAM CHEST 1 VIEW: CPT

## 2022-01-01 PROCEDURE — 3700000001 HC ADD 15 MINUTES (ANESTHESIA): Performed by: SURGERY

## 2022-01-01 PROCEDURE — 3700000000 HC ANESTHESIA ATTENDED CARE: Performed by: SURGERY

## 2022-01-01 PROCEDURE — 83605 ASSAY OF LACTIC ACID: CPT

## 2022-01-01 PROCEDURE — 76705 ECHO EXAM OF ABDOMEN: CPT

## 2022-01-01 PROCEDURE — A9577 INJ MULTIHANCE: HCPCS | Performed by: RADIOLOGY

## 2022-01-01 PROCEDURE — 10035 PLMT SFT TISS LOCLZJ DEV 1ST: CPT

## 2022-01-01 PROCEDURE — 83735 ASSAY OF MAGNESIUM: CPT

## 2022-01-01 PROCEDURE — 99291 CRITICAL CARE FIRST HOUR: CPT | Performed by: INTERNAL MEDICINE

## 2022-01-01 PROCEDURE — 0FB13ZX EXCISION OF RIGHT LOBE LIVER, PERCUTANEOUS APPROACH, DIAGNOSTIC: ICD-10-PCS | Performed by: RADIOLOGY

## 2022-01-01 PROCEDURE — 96375 TX/PRO/DX INJ NEW DRUG ADDON: CPT

## 2022-01-01 PROCEDURE — A9503 TC99M MEDRONATE: HCPCS | Performed by: RADIOLOGY

## 2022-01-01 PROCEDURE — 3600000003 HC SURGERY LEVEL 3 BASE: Performed by: SURGERY

## 2022-01-01 PROCEDURE — 93005 ELECTROCARDIOGRAM TRACING: CPT | Performed by: EMERGENCY MEDICINE

## 2022-01-01 PROCEDURE — 87088 URINE BACTERIA CULTURE: CPT

## 2022-01-01 PROCEDURE — 36561 INSERT TUNNELED CV CATH: CPT | Performed by: SURGERY

## 2022-01-01 PROCEDURE — 3430000000 HC RX DIAGNOSTIC RADIOPHARMACEUTICAL: Performed by: RADIOLOGY

## 2022-01-01 PROCEDURE — 82805 BLOOD GASES W/O2 SATURATION: CPT

## 2022-01-01 PROCEDURE — A4217 STERILE WATER/SALINE, 500 ML: HCPCS | Performed by: SURGERY

## 2022-01-01 PROCEDURE — 99214 OFFICE O/P EST MOD 30 MIN: CPT | Performed by: INTERNAL MEDICINE

## 2022-01-01 PROCEDURE — 99213 OFFICE O/P EST LOW 20 MIN: CPT

## 2022-01-01 PROCEDURE — 85730 THROMBOPLASTIN TIME PARTIAL: CPT

## 2022-01-01 PROCEDURE — 99422 OL DIG E/M SVC 11-20 MIN: CPT | Performed by: FAMILY MEDICINE

## 2022-01-01 PROCEDURE — 96374 THER/PROPH/DIAG INJ IV PUSH: CPT

## 2022-01-01 PROCEDURE — 84100 ASSAY OF PHOSPHORUS: CPT

## 2022-01-01 PROCEDURE — 82436 ASSAY OF URINE CHLORIDE: CPT

## 2022-01-01 PROCEDURE — 3600000013 HC SURGERY LEVEL 3 ADDTL 15MIN: Performed by: SURGERY

## 2022-01-01 DEVICE — PORT INFUS 8FR PWR INJ CT FOR VASC ACCS CATH: Type: IMPLANTABLE DEVICE | Status: FUNCTIONAL

## 2022-01-01 RX ORDER — FERROUS SULFATE 325(65) MG
325 TABLET ORAL
Qty: 90 TABLET | Refills: 0 | Status: SHIPPED
Start: 2022-01-01 | End: 2022-01-01

## 2022-01-01 RX ORDER — SODIUM CHLORIDE 0.9 % (FLUSH) 0.9 %
5-40 SYRINGE (ML) INJECTION EVERY 12 HOURS SCHEDULED
Status: DISCONTINUED | OUTPATIENT
Start: 2022-01-01 | End: 2022-01-01

## 2022-01-01 RX ORDER — HALOPERIDOL 5 MG/ML
5 INJECTION INTRAMUSCULAR
Status: DISCONTINUED | OUTPATIENT
Start: 2022-01-01 | End: 2022-07-14 | Stop reason: HOSPADM

## 2022-01-01 RX ORDER — ONDANSETRON 4 MG/1
4 TABLET, FILM COATED ORAL EVERY 8 HOURS PRN
Status: DISCONTINUED | OUTPATIENT
Start: 2022-01-01 | End: 2022-01-01

## 2022-01-01 RX ORDER — SODIUM CHLORIDE 9 MG/ML
25 INJECTION, SOLUTION INTRAVENOUS PRN
Status: CANCELLED | OUTPATIENT
Start: 2022-01-01

## 2022-01-01 RX ORDER — OXYCODONE HYDROCHLORIDE 5 MG/1
5 TABLET ORAL EVERY 6 HOURS
Status: DISCONTINUED | OUTPATIENT
Start: 2022-01-01 | End: 2022-01-01

## 2022-01-01 RX ORDER — SODIUM CHLORIDE, SODIUM LACTATE, POTASSIUM CHLORIDE, AND CALCIUM CHLORIDE .6; .31; .03; .02 G/100ML; G/100ML; G/100ML; G/100ML
1000 INJECTION, SOLUTION INTRAVENOUS ONCE
Status: COMPLETED | OUTPATIENT
Start: 2022-01-01 | End: 2022-01-01

## 2022-01-01 RX ORDER — HALOPERIDOL 5 MG/ML
5 INJECTION INTRAMUSCULAR EVERY 6 HOURS PRN
Status: DISCONTINUED | OUTPATIENT
Start: 2022-01-01 | End: 2022-01-01

## 2022-01-01 RX ORDER — ONDANSETRON 2 MG/ML
4 INJECTION INTRAMUSCULAR; INTRAVENOUS ONCE
Status: COMPLETED | OUTPATIENT
Start: 2022-01-01 | End: 2022-01-01

## 2022-01-01 RX ORDER — FUROSEMIDE 20 MG/1
TABLET ORAL
Qty: 90 TABLET | OUTPATIENT
Start: 2022-01-01

## 2022-01-01 RX ORDER — 0.9 % SODIUM CHLORIDE 0.9 %
1000 INTRAVENOUS SOLUTION INTRAVENOUS ONCE
Status: COMPLETED | OUTPATIENT
Start: 2022-01-01 | End: 2022-01-01

## 2022-01-01 RX ORDER — SODIUM CHLORIDE 0.9 % (FLUSH) 0.9 %
5-40 SYRINGE (ML) INJECTION PRN
Status: DISCONTINUED | OUTPATIENT
Start: 2022-01-01 | End: 2022-01-01

## 2022-01-01 RX ORDER — PANTOPRAZOLE SODIUM 40 MG/1
40 TABLET, DELAYED RELEASE ORAL
Status: DISCONTINUED | OUTPATIENT
Start: 2022-01-01 | End: 2022-01-01

## 2022-01-01 RX ORDER — SODIUM CHLORIDE, SODIUM LACTATE, POTASSIUM CHLORIDE, CALCIUM CHLORIDE 600; 310; 30; 20 MG/100ML; MG/100ML; MG/100ML; MG/100ML
INJECTION, SOLUTION INTRAVENOUS CONTINUOUS
Status: DISCONTINUED | OUTPATIENT
Start: 2022-01-01 | End: 2022-01-01

## 2022-01-01 RX ORDER — GABAPENTIN 100 MG/1
100 CAPSULE ORAL 3 TIMES DAILY
Qty: 90 CAPSULE | Refills: 1 | Status: SHIPPED | OUTPATIENT
Start: 2022-01-01 | End: 2022-01-01

## 2022-01-01 RX ORDER — FENTANYL CITRATE 50 UG/ML
INJECTION, SOLUTION INTRAMUSCULAR; INTRAVENOUS CONTINUOUS PRN
Status: DISCONTINUED | OUTPATIENT
Start: 2022-01-01 | End: 2022-01-01 | Stop reason: SDUPTHER

## 2022-01-01 RX ORDER — DEXTROMETHORPHAN HYDROBROMIDE AND PROMETHAZINE HYDROCHLORIDE 15; 6.25 MG/5ML; MG/5ML
5 SYRUP ORAL 4 TIMES DAILY PRN
Qty: 240 ML | Refills: 0 | Status: SHIPPED | OUTPATIENT
Start: 2022-01-01 | End: 2022-01-01

## 2022-01-01 RX ORDER — HEPARIN SODIUM (PORCINE) LOCK FLUSH IV SOLN 100 UNIT/ML 100 UNIT/ML
SOLUTION INTRAVENOUS PRN
Status: DISCONTINUED | OUTPATIENT
Start: 2022-01-01 | End: 2022-01-01 | Stop reason: ALTCHOICE

## 2022-01-01 RX ORDER — DEXAMETHASONE SODIUM PHOSPHATE 10 MG/ML
INJECTION INTRAMUSCULAR; INTRAVENOUS PRN
Status: DISCONTINUED | OUTPATIENT
Start: 2022-01-01 | End: 2022-01-01 | Stop reason: SDUPTHER

## 2022-01-01 RX ORDER — FENTANYL CITRATE 0.05 MG/ML
INJECTION, SOLUTION INTRAMUSCULAR; INTRAVENOUS
Status: COMPLETED | OUTPATIENT
Start: 2022-01-01 | End: 2022-01-01

## 2022-01-01 RX ORDER — HYDROMORPHONE HYDROCHLORIDE 1 MG/ML
1.5 INJECTION, SOLUTION INTRAMUSCULAR; INTRAVENOUS; SUBCUTANEOUS
Status: DISCONTINUED | OUTPATIENT
Start: 2022-01-01 | End: 2022-01-01

## 2022-01-01 RX ORDER — DEXTROSE MONOHYDRATE 50 MG/ML
100 INJECTION, SOLUTION INTRAVENOUS PRN
Status: DISCONTINUED | OUTPATIENT
Start: 2022-01-01 | End: 2022-01-01

## 2022-01-01 RX ORDER — DEXTROSE, SODIUM CHLORIDE, SODIUM LACTATE, POTASSIUM CHLORIDE, AND CALCIUM CHLORIDE 5; .6; .31; .03; .02 G/100ML; G/100ML; G/100ML; G/100ML; G/100ML
INJECTION, SOLUTION INTRAVENOUS CONTINUOUS
Status: DISCONTINUED | OUTPATIENT
Start: 2022-01-01 | End: 2022-01-01

## 2022-01-01 RX ORDER — ONDANSETRON HYDROCHLORIDE 8 MG/1
TABLET, FILM COATED ORAL
COMMUNITY
Start: 2022-01-01

## 2022-01-01 RX ORDER — AZITHROMYCIN 250 MG/1
250 TABLET, FILM COATED ORAL SEE ADMIN INSTRUCTIONS
Qty: 6 TABLET | Refills: 0 | Status: SHIPPED | OUTPATIENT
Start: 2022-01-01 | End: 2022-01-01

## 2022-01-01 RX ORDER — SODIUM CHLORIDE 9 MG/ML
25 INJECTION, SOLUTION INTRAVENOUS PRN
Status: DISCONTINUED | OUTPATIENT
Start: 2022-01-01 | End: 2022-01-01 | Stop reason: HOSPADM

## 2022-01-01 RX ORDER — SODIUM CHLORIDE 0.9 % (FLUSH) 0.9 %
10 SYRINGE (ML) INJECTION PRN
Status: DISCONTINUED | OUTPATIENT
Start: 2022-01-01 | End: 2022-01-01 | Stop reason: HOSPADM

## 2022-01-01 RX ORDER — MORPHINE SULFATE 15 MG/1
15 TABLET, FILM COATED, EXTENDED RELEASE ORAL EVERY 12 HOURS SCHEDULED
Status: DISCONTINUED | OUTPATIENT
Start: 2022-01-01 | End: 2022-01-01

## 2022-01-01 RX ORDER — ONDANSETRON 2 MG/ML
INJECTION INTRAMUSCULAR; INTRAVENOUS PRN
Status: DISCONTINUED | OUTPATIENT
Start: 2022-01-01 | End: 2022-01-01 | Stop reason: SDUPTHER

## 2022-01-01 RX ORDER — NOREPINEPHRINE BIT/0.9 % NACL 16MG/250ML
1-100 INFUSION BOTTLE (ML) INTRAVENOUS CONTINUOUS
Status: DISCONTINUED | OUTPATIENT
Start: 2022-01-01 | End: 2022-01-01

## 2022-01-01 RX ORDER — MIDAZOLAM HYDROCHLORIDE 1 MG/ML
0.5 INJECTION INTRAMUSCULAR; INTRAVENOUS
Status: DISCONTINUED | OUTPATIENT
Start: 2022-01-01 | End: 2022-07-14 | Stop reason: HOSPADM

## 2022-01-01 RX ORDER — GABAPENTIN 100 MG/1
300 CAPSULE ORAL 3 TIMES DAILY
Qty: 270 CAPSULE | Refills: 2 | Status: SHIPPED | OUTPATIENT
Start: 2022-01-01 | End: 2022-07-31

## 2022-01-01 RX ORDER — VITAMIN B COMPLEX
2 TABLET ORAL DAILY
Status: DISCONTINUED | OUTPATIENT
Start: 2022-01-01 | End: 2022-01-01

## 2022-01-01 RX ORDER — PROMETHAZINE HYDROCHLORIDE 25 MG/ML
25 INJECTION, SOLUTION INTRAMUSCULAR; INTRAVENOUS ONCE
Status: DISCONTINUED | OUTPATIENT
Start: 2022-01-01 | End: 2022-07-14 | Stop reason: HOSPADM

## 2022-01-01 RX ORDER — PROPOFOL 10 MG/ML
INJECTION, EMULSION INTRAVENOUS CONTINUOUS PRN
Status: DISCONTINUED | OUTPATIENT
Start: 2022-01-01 | End: 2022-01-01 | Stop reason: SDUPTHER

## 2022-01-01 RX ORDER — SODIUM CHLORIDE 9 MG/ML
INJECTION, SOLUTION INTRAVENOUS CONTINUOUS
Status: DISCONTINUED | OUTPATIENT
Start: 2022-01-01 | End: 2022-01-01 | Stop reason: HOSPADM

## 2022-01-01 RX ORDER — HYDROMORPHONE HYDROCHLORIDE 1 MG/ML
1 INJECTION, SOLUTION INTRAMUSCULAR; INTRAVENOUS; SUBCUTANEOUS
Status: DISCONTINUED | OUTPATIENT
Start: 2022-01-01 | End: 2022-01-01

## 2022-01-01 RX ORDER — SODIUM CHLORIDE 0.9 % (FLUSH) 0.9 %
10 SYRINGE (ML) INJECTION PRN
Status: CANCELLED | OUTPATIENT
Start: 2022-01-01

## 2022-01-01 RX ORDER — PNV NO.95/FERROUS FUM/FOLIC AC 28MG-0.8MG
TABLET ORAL
Qty: 90 TABLET | Refills: 0 | Status: SHIPPED | OUTPATIENT
Start: 2022-01-01

## 2022-01-01 RX ORDER — IBUPROFEN 600 MG/1
600 TABLET ORAL EVERY 6 HOURS PRN
COMMUNITY

## 2022-01-01 RX ORDER — HYDROMORPHONE HYDROCHLORIDE 1 MG/ML
0.5 INJECTION, SOLUTION INTRAMUSCULAR; INTRAVENOUS; SUBCUTANEOUS
Status: DISCONTINUED | OUTPATIENT
Start: 2022-01-01 | End: 2022-01-01

## 2022-01-01 RX ORDER — SODIUM CHLORIDE 0.9 % (FLUSH) 0.9 %
10 SYRINGE (ML) INJECTION EVERY 12 HOURS SCHEDULED
Status: DISCONTINUED | OUTPATIENT
Start: 2022-01-01 | End: 2022-01-01 | Stop reason: HOSPADM

## 2022-01-01 RX ORDER — SODIUM CHLORIDE 9 MG/ML
INJECTION, SOLUTION INTRAVENOUS CONTINUOUS
Status: CANCELLED | OUTPATIENT
Start: 2022-01-01

## 2022-01-01 RX ORDER — ONDANSETRON 4 MG/1
8 TABLET, FILM COATED ORAL EVERY 8 HOURS PRN
Status: DISCONTINUED | OUTPATIENT
Start: 2022-01-01 | End: 2022-01-01

## 2022-01-01 RX ORDER — FLUOXETINE HYDROCHLORIDE 20 MG/1
20 CAPSULE ORAL DAILY
Status: DISCONTINUED | OUTPATIENT
Start: 2022-01-01 | End: 2022-01-01

## 2022-01-01 RX ORDER — SODIUM CHLORIDE 9 MG/ML
INJECTION, SOLUTION INTRAVENOUS PRN
Status: DISCONTINUED | OUTPATIENT
Start: 2022-01-01 | End: 2022-01-01

## 2022-01-01 RX ORDER — GABAPENTIN 300 MG/1
300 CAPSULE ORAL 3 TIMES DAILY
Status: DISCONTINUED | OUTPATIENT
Start: 2022-01-01 | End: 2022-01-01

## 2022-01-01 RX ORDER — FUROSEMIDE 20 MG/1
TABLET ORAL
Qty: 30 TABLET | Refills: 2 | Status: SHIPPED | OUTPATIENT
Start: 2022-01-01

## 2022-01-01 RX ORDER — DEXAMETHASONE 4 MG/1
TABLET ORAL
COMMUNITY
Start: 2022-01-01

## 2022-01-01 RX ORDER — MORPHINE SULFATE 5 MG/ML
5 INJECTION, SOLUTION INTRAMUSCULAR; INTRAVENOUS ONCE
Status: COMPLETED | OUTPATIENT
Start: 2022-01-01 | End: 2022-01-01

## 2022-01-01 RX ORDER — FENTANYL CITRATE 0.05 MG/ML
50 INJECTION, SOLUTION INTRAMUSCULAR; INTRAVENOUS ONCE
Status: COMPLETED | OUTPATIENT
Start: 2022-01-01 | End: 2022-01-01

## 2022-01-01 RX ORDER — DEXTROSE MONOHYDRATE 100 MG/ML
250 INJECTION, SOLUTION INTRAVENOUS ONCE
Status: COMPLETED | OUTPATIENT
Start: 2022-01-01 | End: 2022-01-01

## 2022-01-01 RX ORDER — MIDAZOLAM HYDROCHLORIDE 1 MG/ML
INJECTION INTRAMUSCULAR; INTRAVENOUS PRN
Status: DISCONTINUED | OUTPATIENT
Start: 2022-01-01 | End: 2022-01-01 | Stop reason: SDUPTHER

## 2022-01-01 RX ORDER — TC 99M MEDRONATE 20 MG/10ML
25 INJECTION, POWDER, LYOPHILIZED, FOR SOLUTION INTRAVENOUS
Status: COMPLETED | OUTPATIENT
Start: 2022-01-01 | End: 2022-01-01

## 2022-01-01 RX ORDER — POLYETHYLENE GLYCOL 3350 17 G/17G
17 POWDER, FOR SOLUTION ORAL DAILY PRN
Status: DISCONTINUED | OUTPATIENT
Start: 2022-01-01 | End: 2022-01-01

## 2022-01-01 RX ORDER — ENOXAPARIN SODIUM 100 MG/ML
40 INJECTION SUBCUTANEOUS DAILY
Status: DISCONTINUED | OUTPATIENT
Start: 2022-01-01 | End: 2022-01-01

## 2022-01-01 RX ORDER — SODIUM CHLORIDE 0.9 % (FLUSH) 0.9 %
10 SYRINGE (ML) INJECTION EVERY 12 HOURS SCHEDULED
Status: CANCELLED | OUTPATIENT
Start: 2022-01-01

## 2022-01-01 RX ADMIN — SODIUM BICARBONATE 50 MEQ: 84 INJECTION, SOLUTION INTRAVENOUS at 11:00

## 2022-01-01 RX ADMIN — HYDROMORPHONE HYDROCHLORIDE 1 MG: 1 INJECTION, SOLUTION INTRAMUSCULAR; INTRAVENOUS; SUBCUTANEOUS at 12:32

## 2022-01-01 RX ADMIN — SODIUM BICARBONATE: 84 INJECTION, SOLUTION INTRAVENOUS at 11:45

## 2022-01-01 RX ADMIN — IOPAMIDOL 75 ML: 755 INJECTION, SOLUTION INTRAVENOUS at 10:46

## 2022-01-01 RX ADMIN — SODIUM CHLORIDE 1000 MG: 9 INJECTION, SOLUTION INTRAVENOUS at 02:56

## 2022-01-01 RX ADMIN — SODIUM CHLORIDE: 9 INJECTION, SOLUTION INTRAVENOUS at 07:55

## 2022-01-01 RX ADMIN — TC 99M MEDRONATE 25 MILLICURIE: 20 INJECTION, POWDER, LYOPHILIZED, FOR SOLUTION INTRAVENOUS at 10:22

## 2022-01-01 RX ADMIN — SODIUM CHLORIDE, POTASSIUM CHLORIDE, SODIUM LACTATE AND CALCIUM CHLORIDE: 600; 310; 30; 20 INJECTION, SOLUTION INTRAVENOUS at 04:46

## 2022-01-01 RX ADMIN — SODIUM BICARBONATE 50 MEQ: 84 INJECTION INTRAVENOUS at 11:12

## 2022-01-01 RX ADMIN — PROPOFOL 120 MCG/KG/MIN: 10 INJECTION, EMULSION INTRAVENOUS at 09:12

## 2022-01-01 RX ADMIN — SODIUM CHLORIDE, PRESERVATIVE FREE 10 ML: 5 INJECTION INTRAVENOUS at 20:44

## 2022-01-01 RX ADMIN — MIDAZOLAM 0.5 MG: 1 INJECTION INTRAMUSCULAR; INTRAVENOUS at 13:54

## 2022-01-01 RX ADMIN — DEXTROSE MONOHYDRATE 250 ML: 100 INJECTION, SOLUTION INTRAVENOUS at 11:19

## 2022-01-01 RX ADMIN — Medication 2000 UNITS: at 08:06

## 2022-01-01 RX ADMIN — ONDANSETRON 4 MG: 2 INJECTION INTRAMUSCULAR; INTRAVENOUS at 21:00

## 2022-01-01 RX ADMIN — ONDANSETRON 4 MG: 2 INJECTION INTRAMUSCULAR; INTRAVENOUS at 09:36

## 2022-01-01 RX ADMIN — MORPHINE SULFATE 2 MG/HR: 10 INJECTION, SOLUTION INTRAMUSCULAR; INTRAVENOUS at 12:41

## 2022-01-01 RX ADMIN — SODIUM BICARBONATE: 84 INJECTION, SOLUTION INTRAVENOUS at 00:21

## 2022-01-01 RX ADMIN — FENTANYL CITRATE 50 MCG: 50 INJECTION INTRAMUSCULAR; INTRAVENOUS at 15:55

## 2022-01-01 RX ADMIN — SODIUM CHLORIDE, PRESERVATIVE FREE 10 ML: 5 INJECTION INTRAVENOUS at 08:49

## 2022-01-01 RX ADMIN — CALCIUM GLUCONATE 1000 MG: 98 INJECTION, SOLUTION INTRAVENOUS at 11:17

## 2022-01-01 RX ADMIN — OXYCODONE 5 MG: 5 TABLET ORAL at 20:43

## 2022-01-01 RX ADMIN — HYDROMORPHONE HYDROCHLORIDE 0.5 MG: 1 INJECTION, SOLUTION INTRAMUSCULAR; INTRAVENOUS; SUBCUTANEOUS at 04:41

## 2022-01-01 RX ADMIN — MORPHINE SULFATE 15 MG: 15 TABLET, FILM COATED, EXTENDED RELEASE ORAL at 08:03

## 2022-01-01 RX ADMIN — MIDAZOLAM 1 MG: 1 INJECTION INTRAMUSCULAR; INTRAVENOUS at 09:10

## 2022-01-01 RX ADMIN — GABAPENTIN 300 MG: 300 CAPSULE ORAL at 08:05

## 2022-01-01 RX ADMIN — SODIUM CHLORIDE: 9 INJECTION, SOLUTION INTRAVENOUS at 09:10

## 2022-01-01 RX ADMIN — CEFEPIME HYDROCHLORIDE 2000 MG: 2 INJECTION, POWDER, FOR SOLUTION INTRAMUSCULAR; INTRAVENOUS at 12:51

## 2022-01-01 RX ADMIN — HYDROMORPHONE HYDROCHLORIDE 1 MG: 1 INJECTION, SOLUTION INTRAMUSCULAR; INTRAVENOUS; SUBCUTANEOUS at 05:51

## 2022-01-01 RX ADMIN — IOPAMIDOL 75 ML: 755 INJECTION, SOLUTION INTRAVENOUS at 22:41

## 2022-01-01 RX ADMIN — DEXAMETHASONE SODIUM PHOSPHATE 8 MG: 10 INJECTION INTRAMUSCULAR; INTRAVENOUS at 09:36

## 2022-01-01 RX ADMIN — MIDAZOLAM 0.5 MG: 1 INJECTION INTRAMUSCULAR; INTRAVENOUS at 18:25

## 2022-01-01 RX ADMIN — ENOXAPARIN SODIUM 40 MG: 100 INJECTION SUBCUTANEOUS at 08:48

## 2022-01-01 RX ADMIN — FENTANYL CITRATE 50 MCG: 0.05 INJECTION, SOLUTION INTRAMUSCULAR; INTRAVENOUS at 21:02

## 2022-01-01 RX ADMIN — SODIUM CHLORIDE, PRESERVATIVE FREE 10 ML: 5 INJECTION INTRAVENOUS at 08:53

## 2022-01-01 RX ADMIN — HYDROMORPHONE HYDROCHLORIDE 1 MG: 1 INJECTION, SOLUTION INTRAMUSCULAR; INTRAVENOUS; SUBCUTANEOUS at 08:24

## 2022-01-01 RX ADMIN — SODIUM BICARBONATE 50 MEQ: 84 INJECTION INTRAVENOUS at 10:23

## 2022-01-01 RX ADMIN — SODIUM BICARBONATE: 84 INJECTION, SOLUTION INTRAVENOUS at 10:41

## 2022-01-01 RX ADMIN — POLYETHYLENE GLYCOL 3350 17 G: 17 POWDER, FOR SOLUTION ORAL at 12:46

## 2022-01-01 RX ADMIN — GADOBENATE DIMEGLUMINE 16 ML: 529 INJECTION, SOLUTION INTRAVENOUS at 11:11

## 2022-01-01 RX ADMIN — GABAPENTIN 300 MG: 300 CAPSULE ORAL at 20:43

## 2022-01-01 RX ADMIN — FENTANYL CITRATE 25 MCG: 50 INJECTION INTRAMUSCULAR; INTRAVENOUS at 16:02

## 2022-01-01 RX ADMIN — SODIUM CHLORIDE, SODIUM LACTATE, POTASSIUM CHLORIDE, CALCIUM CHLORIDE AND DEXTROSE MONOHYDRATE: 5; 600; 310; 30; 20 INJECTION, SOLUTION INTRAVENOUS at 09:11

## 2022-01-01 RX ADMIN — SODIUM CHLORIDE, PRESERVATIVE FREE 10 ML: 5 INJECTION INTRAVENOUS at 08:27

## 2022-01-01 RX ADMIN — IOHEXOL 50 ML: 240 INJECTION, SOLUTION INTRATHECAL; INTRAVASCULAR; INTRAVENOUS; ORAL at 10:46

## 2022-01-01 RX ADMIN — FLUOXETINE 20 MG: 20 CAPSULE ORAL at 08:03

## 2022-01-01 RX ADMIN — HALOPERIDOL LACTATE 5 MG: 5 INJECTION, SOLUTION INTRAMUSCULAR at 13:54

## 2022-01-01 RX ADMIN — Medication 2000 UNITS: at 08:47

## 2022-01-01 RX ADMIN — Medication 2000 MG: at 09:07

## 2022-01-01 RX ADMIN — SODIUM BICARBONATE: 84 INJECTION, SOLUTION INTRAVENOUS at 08:54

## 2022-01-01 RX ADMIN — CEFEPIME HYDROCHLORIDE 2000 MG: 2 INJECTION, POWDER, FOR SOLUTION INTRAMUSCULAR; INTRAVENOUS at 10:30

## 2022-01-01 RX ADMIN — FENTANYL CITRATE 50 MCG/KG/HR: 50 INJECTION, SOLUTION INTRAMUSCULAR; INTRAVENOUS at 09:12

## 2022-01-01 RX ADMIN — PANTOPRAZOLE SODIUM 40 MG: 40 TABLET, DELAYED RELEASE ORAL at 05:59

## 2022-01-01 RX ADMIN — MORPHINE SULFATE 5 MG: 5 INJECTION, SOLUTION INTRAMUSCULAR; INTRAVENOUS at 01:20

## 2022-01-01 RX ADMIN — SODIUM CHLORIDE, POTASSIUM CHLORIDE, SODIUM LACTATE AND CALCIUM CHLORIDE 1000 ML: 600; 310; 30; 20 INJECTION, SOLUTION INTRAVENOUS at 15:07

## 2022-01-01 RX ADMIN — DEXTROSE MONOHYDRATE 250 ML: 100 INJECTION, SOLUTION INTRAVENOUS at 08:18

## 2022-01-01 RX ADMIN — SODIUM CHLORIDE 1000 ML: 9 INJECTION, SOLUTION INTRAVENOUS at 22:35

## 2022-01-01 RX ADMIN — Medication 5 MCG/MIN: at 11:18

## 2022-01-01 RX ADMIN — INSULIN HUMAN 10 UNITS: 100 INJECTION, SOLUTION PARENTERAL at 11:12

## 2022-01-01 RX ADMIN — SODIUM CHLORIDE 1000 ML: 9 INJECTION, SOLUTION INTRAVENOUS at 01:43

## 2022-01-01 RX ADMIN — FLUOXETINE 20 MG: 20 CAPSULE ORAL at 08:48

## 2022-01-01 RX ADMIN — GABAPENTIN 300 MG: 300 CAPSULE ORAL at 08:48

## 2022-01-01 RX ADMIN — CEFEPIME HYDROCHLORIDE 2000 MG: 2 INJECTION, POWDER, FOR SOLUTION INTRAMUSCULAR; INTRAVENOUS at 23:14

## 2022-01-01 RX ADMIN — SODIUM CHLORIDE 1000 ML: 9 INJECTION, SOLUTION INTRAVENOUS at 13:32

## 2022-01-01 RX ADMIN — SODIUM CHLORIDE 1000 ML: 9 INJECTION, SOLUTION INTRAVENOUS at 20:56

## 2022-01-01 RX ADMIN — HALOPERIDOL LACTATE 5 MG: 5 INJECTION, SOLUTION INTRAMUSCULAR at 10:34

## 2022-01-01 RX ADMIN — MIDAZOLAM 1 MG: 1 INJECTION INTRAMUSCULAR; INTRAVENOUS at 09:08

## 2022-01-01 RX ADMIN — PANTOPRAZOLE SODIUM 40 MG: 40 TABLET, DELAYED RELEASE ORAL at 08:06

## 2022-01-01 ASSESSMENT — ENCOUNTER SYMPTOMS
BLOOD IN STOOL: 0
VOMITING: 0
EYE PAIN: 0
EYES NEGATIVE: 1
NAUSEA: 1
BLOOD IN STOOL: 0
SORE THROAT: 0
COUGH: 1
SINUS PAIN: 0
SORE THROAT: 0
EYE PAIN: 0
BACK PAIN: 0
COUGH: 0
WHEEZING: 0
EYE DISCHARGE: 0
COUGH: 0
NAUSEA: 0
NAUSEA: 0
BACK PAIN: 0
ABDOMINAL DISTENTION: 0
ABDOMINAL PAIN: 0
NAUSEA: 1
SHORTNESS OF BREATH: 0
EYE DISCHARGE: 0
WHEEZING: 0
CONSTIPATION: 0
BACK PAIN: 0
SHORTNESS OF BREATH: 0
DIARRHEA: 0
RESPIRATORY NEGATIVE: 1
DIARRHEA: 0
ABDOMINAL DISTENTION: 0
CONSTIPATION: 0
EYE REDNESS: 0
BLOOD IN STOOL: 0
VOMITING: 0
TACHYPNEA: 1
SHORTNESS OF BREATH: 0
ABDOMINAL PAIN: 0
ABDOMINAL PAIN: 1
VOMITING: 1
SINUS PRESSURE: 0
COUGH: 0
ANAL BLEEDING: 0
SORE THROAT: 0
DIARRHEA: 0
SHORTNESS OF BREATH: 1
ALLERGIC/IMMUNOLOGIC NEGATIVE: 1
ABDOMINAL PAIN: 0

## 2022-01-01 ASSESSMENT — PAIN - FUNCTIONAL ASSESSMENT
PAIN_FUNCTIONAL_ASSESSMENT: 0-10
PAIN_FUNCTIONAL_ASSESSMENT: PREVENTS OR INTERFERES SOME ACTIVE ACTIVITIES AND ADLS

## 2022-01-01 ASSESSMENT — PAIN SCALES - GENERAL
PAINLEVEL_OUTOF10: 0
PAINLEVEL_OUTOF10: 6
PAINLEVEL_OUTOF10: 0
PAINLEVEL_OUTOF10: 7
PAINLEVEL_OUTOF10: 0
PAINLEVEL_OUTOF10: 2
PAINLEVEL_OUTOF10: 7
PAINLEVEL_OUTOF10: 0
PAINLEVEL_OUTOF10: 3
PAINLEVEL_OUTOF10: 0
PAINLEVEL_OUTOF10: 9
PAINLEVEL_OUTOF10: 10
PAINLEVEL_OUTOF10: 0
PAINLEVEL_OUTOF10: 6

## 2022-01-01 ASSESSMENT — PAIN DESCRIPTION - LOCATION
LOCATION: ABDOMEN
LOCATION: GENERALIZED
LOCATION: FLANK
LOCATION: ABDOMEN
LOCATION: GENERALIZED
LOCATION: GENERALIZED
LOCATION: FLANK

## 2022-01-01 ASSESSMENT — PAIN SCALES - PAIN ASSESSMENT IN ADVANCED DEMENTIA (PAINAD)
BREATHING: 0
FACIALEXPRESSION: 0
CONSOLABILITY: 0
NEGVOCALIZATION: 0
BODYLANGUAGE: 0
TOTALSCORE: 0

## 2022-01-01 ASSESSMENT — PAIN DESCRIPTION - ORIENTATION
ORIENTATION: RIGHT
ORIENTATION: RIGHT;UPPER
ORIENTATION: RIGHT

## 2022-01-01 ASSESSMENT — PAIN DESCRIPTION - DESCRIPTORS
DESCRIPTORS: THROBBING;SHOOTING;DISCOMFORT
DESCRIPTORS: THROBBING;DISCOMFORT
DESCRIPTORS: ACHING;DISCOMFORT
DESCRIPTORS: ACHING;CRUSHING
DESCRIPTORS: SHARP

## 2022-01-01 ASSESSMENT — PATIENT HEALTH QUESTIONNAIRE - PHQ9
SUM OF ALL RESPONSES TO PHQ QUESTIONS 1-9: 0
2. FEELING DOWN, DEPRESSED OR HOPELESS: 0
1. LITTLE INTEREST OR PLEASURE IN DOING THINGS: 0
SUM OF ALL RESPONSES TO PHQ QUESTIONS 1-9: 0
SUM OF ALL RESPONSES TO PHQ QUESTIONS 1-9: 0
SUM OF ALL RESPONSES TO PHQ9 QUESTIONS 1 & 2: 0
SUM OF ALL RESPONSES TO PHQ QUESTIONS 1-9: 0

## 2022-01-01 ASSESSMENT — LIFESTYLE VARIABLES: HOW OFTEN DO YOU HAVE A DRINK CONTAINING ALCOHOL: NEVER

## 2022-01-01 ASSESSMENT — PULMONARY FUNCTION TESTS
PIF_VALUE: 0
PIF_VALUE: 0

## 2022-01-11 NOTE — TELEPHONE ENCOUNTER
Call to patient regarding her recent breast biopsy results. She states she saw results in  her [employer] account. Instructed in detail on her breast biopsy pathology findings including cancer type (IDC) and hormone receptor status (triple negative). Instructed on next steps including following up with Dr. Dorcas Lopez for a referral for breast surgery and/or oncology consultation. She indicates that she would like to have a  breast surgery consultation with Dr. Nathan Kennedy. Instructed that I will notify [facility] of her request.  I answered her questions to her apparent satisfaction. She is agreeable to receive a packet of literature about breast cancer treatment by mail. Provided with my contact information and instructed patient to call me with questions or concerns. Verbalizes understanding. Packet placed in outgoing mail containing a guide to the breast cancer pathology report, handout on the diagnosis and treatment of triple negative  invasive ductal carcinoma , ,  [facility] Sister Support group information,  list of outpatient counseling agencies, and list of local oncology practices. Breast pathology report faxed to Dr. Linda Dubon with \"OK\" fax confirmation received.  Electronically signed by Ralph Duval RN, BSN on 1/11/2022 at 12:50 PM

## 2022-01-14 PROBLEM — C50.411 MALIGNANT NEOPLASM OF UPPER-OUTER QUADRANT OF RIGHT BREAST IN FEMALE, ESTROGEN RECEPTOR NEGATIVE (HCC): Status: ACTIVE | Noted: 2022-01-01

## 2022-01-14 PROBLEM — Z17.1 MALIGNANT NEOPLASM OF UPPER-OUTER QUADRANT OF RIGHT BREAST IN FEMALE, ESTROGEN RECEPTOR NEGATIVE (HCC): Status: ACTIVE | Noted: 2022-01-01

## 2022-01-14 NOTE — PROGRESS NOTES
[facility]  HISTORY & PHYSICAL  ATTENDING NOTE    Patient's Name/Date of Birth: Grabiel Beckford / 1975    Date: 2022     Chief Complaint   Patient presents with    Consultation     NEW BREAST CANCER:  Right breast -- Invasive ductal carcinoma -- ER(-) SC(-) HER2(-)  imaging/biopsy [facility]  --- Referring Dr Johan Hatfield     breast mass - painful, states up into axillary, at worst 5-6/10 - states the skin looks different where the mass is         Grabiel Beckford presents for evaluation of a mammographic abnormality. PCP: Shy Dawn MD. Gynecologist: Dr. Huy Villasenor. Referred by:  Dr. Ila Delacruz    The mammogram was performed at [facility] on 2021. The patient has noted a change in BSE since presentation. Breast mass present x 2 months  Patient denies nipple discharge. Patient denies a personal history of breast cancer. Breast cancer risk factors include family hx on mother's side and age and gender. Ashkenazi Yazidism Ancestry: No.    OBSTETRIC RELATED HISTORY:  Age of menarche was 14. Age of menopause --may be perimenopausal; has menses for 3 weeks out of the month  Patient denies hormonal therapy. OCPs years ago, got headaches--not even a year; had norplant in 4 years and had BC shots   Patient is . Age of first live birth was 15. Patient did not breast feed. Is patient interested in fertility information about fertility preservation? No    CANCER SURVEILLANCE HISTORY:  Mammograms: No   Breast MRI's: No   Breast Biopsies: Yes   Colonoscopy: No has it scheduled  GI Polyps: Not Applicable   EGD: No   Pelvic Exam: Yes   Pap Smear: Yes   Dermatology: No   Lung screening: no  H/O DVT:  no  H/O Radiation:  no        Estimated body mass index is 32.88 kg/m² as calculated from the following:    Height as of this encounter: 5' 1\" (1.549 m). Weight as of this encounter: 174 lb (78.9 kg).   Bra Size: 38C    Because violence is so common, we ask all our patients: are you in a relationship or do you live with a person who threatens, hurts, or controls you:  no    Patient drinks moderate caffeinated beverages. Patient does not smoke cigarettes. Patient does use recreational drugs. Medical marijuana--shattered right ankle 5 year ago. Past Medical History:   Diagnosis Date    Anxiety     Headache        Past Surgical History:   Procedure Laterality Date    ANKLE SURGERY      DILATION AND CURETTAGE OF UTERUS      ENDOMETRIAL ABLATION      YADIEL US PERQ DEVICE SOFT TISSUE PLMT  FIRST LESION  1/6/2022    YADIEL US PERQ DEVICE SOFT TISSUE PLMT  FIRST LESION 1/6/2022 [facility]    TUBAL LIGATION  05/2021    US BREAST NEEDLE BIOPSY RIGHT Right 1/6/2022    US BREAST NEEDLE BIOPSY RIGHT 1/6/2022 [facility]       Current Outpatient Medications   Medication Sig Dispense Refill    FLUoxetine (PROZAC) 20 MG capsule Take 20 mg by mouth daily       Cholecalciferol 50 MCG (2000 UT) CHEW Take 1 capsule by mouth daily        No current facility-administered medications for this visit.        Family History   Problem Relation Age of Onset    No Known Problems Mother     Colon Cancer Father     Diabetes Father     High Blood Pressure Father     Alzheimer's Disease Father     Dementia Father     Cancer Father         Melanoma     Breast Cancer Maternal Aunt     Breast Cancer Maternal Grandmother     Breast Cancer Maternal Aunt      Ashkenazi Mandaen Ancestry: No    Allergies   Allergen Reactions    Penicillins        Social History     Socioeconomic History    Marital status:      Spouse name: Not on file    Number of children: Not on file    Years of education: Not on file    Highest education level: Not on file   Occupational History    Not on file   Tobacco Use    Smoking status: Former Smoker     Packs/day: 0.50     Years: 15.00     Pack years: 7.50     Types: Cigarettes     Start date: 1992     Quit date: 2018     Years since quittin.0    Smokeless tobacco: Never Used    Tobacco comment: Con't no smoking    Vaping Use    Vaping Use: Never used   Substance and Sexual Activity    Alcohol use: No    Drug use: Yes     Types: Marijuana Magige Coho)     Comment: Medical     Sexual activity: Yes     Partners: Male   Other Topics Concern    Not on file   Social History Narrative    Not on file     Social Determinants of Health     Financial Resource Strain: Low Risk     Difficulty of Paying Living Expenses: Not hard at all   Food Insecurity: No Food Insecurity    Worried About Running Out of Food in the Last Year: Never true    Rupa of Food in the Last Year: Never true   Transportation Needs: No Transportation Needs    Lack of Transportation (Medical): No    Lack of Transportation (Non-Medical): No   Physical Activity:     Days of Exercise per Week: Not on file    Minutes of Exercise per Session: Not on file   Stress:     Feeling of Stress : Not on file   Social Connections:     Frequency of Communication with Friends and Family: Not on file    Frequency of Social Gatherings with Friends and Family: Not on file    Attends Gnosticist Services: Not on file    Active Member of [address] [place] [address] or Organizations: Not on file    Attends Club or Organization Meetings: Not on file    Marital Status: Not on file   Intimate Partner Violence:     Fear of Current or Ex-Partner: Not on file    Emotionally Abused: Not on file    Physically Abused: Not on file    Sexually Abused: Not on file   Housing Stability:     Unable to Pay for Housing in the Last Year: Not on file    Number of [place] in the Last Year: Not on file    Unstable Housing in the Last Year: Not on file       Occupation: hardware store    Review of Systems   Constitutional: Positive for unexpected weight change (increased last year; has started losing a little weight). Negative for activity change and appetite change.         Has occasional night sweats     HENT: Negative. Eyes: Negative. Respiratory: Negative. Negative for cough and shortness of breath. Cardiovascular: Negative. Negative for chest pain and leg swelling. Gastrointestinal: Positive for nausea (occasional). Negative for abdominal distention, abdominal pain, anal bleeding, blood in stool, constipation, diarrhea and vomiting. Endocrine: Negative. Genitourinary: Negative. Musculoskeletal: Negative. Negative for arthralgias, back pain, gait problem, joint swelling and myalgias. Skin: Negative. Allergic/Immunologic: Negative. Neurological: Positive for headaches (has been getting more headaches, but attributes to mask use--was at home for a long period of time, just started back to work). Negative for dizziness and weakness. Hematological: Negative. Psychiatric/Behavioral: Negative. Negative for confusion, decreased concentration and sleep disturbance. ECOG PS:  0  Covid vaccination? No  Flu Vaccination? No    BP (!) 142/68 (Site: Left Upper Arm, Position: Sitting, Cuff Size: Large Adult)   Pulse 90   Temp 98.4 °F (36.9 °C) (Infrared)   Ht 5' 1\" (1.549 m)   Wt 174 lb (78.9 kg)   SpO2 98%   BMI 32.88 kg/m²   Physical Exam  Constitutional:       Appearance: Normal appearance. She is obese. HENT:      Head: Normocephalic and atraumatic. Nose: Nose normal.      Mouth/Throat:      Mouth: Mucous membranes are moist.      Pharynx: Oropharynx is clear. Eyes:      Extraocular Movements: Extraocular movements intact. Pupils: Pupils are equal, round, and reactive to light. Cardiovascular:      Rate and Rhythm: Normal rate and regular rhythm. Pulses: Normal pulses. Heart sounds: Normal heart sounds. Pulmonary:      Effort: Pulmonary effort is normal.      Breath sounds: Normal breath sounds. Chest:   Breasts:      Right: Inverted nipple (slight inversion), mass, tenderness and axillary adenopathy present.  No swelling, bleeding, nipple discharge, skin change or supraclavicular adenopathy. Left: No swelling, bleeding, inverted nipple, mass, nipple discharge, skin change, tenderness, axillary adenopathy or supraclavicular adenopathy. Abdominal:      General: There is no distension. Palpations: Abdomen is soft. Tenderness: There is no abdominal tenderness. Musculoskeletal:         General: No tenderness or signs of injury. Cervical back: Normal range of motion and neck supple. Lymphadenopathy:      Cervical: No cervical adenopathy. Right cervical: No superficial cervical adenopathy. Left cervical: No superficial cervical adenopathy. Upper Body:      Right upper body: Axillary adenopathy present. No supraclavicular adenopathy. Left upper body: No supraclavicular or axillary adenopathy. Skin:     General: Skin is warm and dry. Neurological:      General: No focal deficit present. Mental Status: She is alert and oriented to person, place, and time. Psychiatric:         Mood and Affect: Mood normal.         Behavior: Behavior normal.         Thought Content:  Thought content normal.         Judgment: Judgment normal.         MAMMOGRAM:  EXAMINATION:   DIAGNOSTIC DIGITAL BILATERAL BREASTS MAMMOGRAM WITH TOMOSYNTHESIS; TARGETED   ULTRASOUND OF THE RIGHT BREAST, 12/29/2021 1:03 pm       TECHNIQUE:   Diagnostic mammography of the bilateral breasts was performed with   tomosynthesis.  2D standard and 3D tomosynthesis combination imaging   performed through both breasts.  Computer aided detection was utilized in the   interpretation of this exam.; Targeted ultrasound of the right breast was   performed.       Views:       COMPARISON:   None       HISTORY:   ORDERING SYSTEM PROVIDED HISTORY: Breast mass, right   TECHNOLOGIST PROVIDED HISTORY:   Reason for exam:->Breast mass, right       FINDINGS:   In the upper outer quadrant of the right breast there is a heterogeneous   ill-defined mass measuring 2.5 cm.  Left breast is unremarkable.       Ultrasound demonstrates a solid hypoechoic mass with multiple lobular margins   and some spiculation.  It maximally measures 2.3 cm.       There is a 1.9 cm mass in the right axilla with spiculated margins.           Impression   Right breast mass and axillary mass both suspicious of malignancy.  Recommend   core biopsy of both lesions       BIRADS:   BIRADS - CATEGORY 4       Suspicious Abnormality.  Biopsy should be considered at this time.       OVERALL ASSESSMENT - SUSPICIOUS       A letter of notification will be sent to the patient regarding the results.       RISK ASSESSMENT:       During this patient's visit, information obtained was used to generate a   lifetime risk assessment using the Tyrer-Cuzick model (also called the HYACINTH   International Breast Cancer Intervention study Breast Cancer Risk Evaluation   Tool).       LIFETIME RISK:       Patient has a Tyrer-Cuzick score of: 14.6%       BREAST TISSUE DENSITY       Heterogeneously dense           ULTRASOUND:  EXAMINATION:   DIAGNOSTIC DIGITAL BILATERAL BREASTS MAMMOGRAM WITH TOMOSYNTHESIS; TARGETED   ULTRASOUND OF THE RIGHT BREAST, 12/29/2021 1:03 pm       TECHNIQUE:   Diagnostic mammography of the bilateral breasts was performed with   tomosynthesis.  2D standard and 3D tomosynthesis combination imaging   performed through both breasts.  Computer aided detection was utilized in the   interpretation of this exam.; Targeted ultrasound of the right breast was   performed.       Views:       COMPARISON:   None       HISTORY:   ORDERING SYSTEM PROVIDED HISTORY: Breast mass, right   TECHNOLOGIST PROVIDED HISTORY:   Reason for exam:->Breast mass, right       FINDINGS:   In the upper outer quadrant of the right breast there is a heterogeneous   ill-defined mass measuring 2.5 cm.  Left breast is unremarkable.       Ultrasound demonstrates a solid hypoechoic mass with multiple lobular margins   and some spiculation.  It maximally measures 2.3 cm.       There is a 1.9 cm mass in the right axilla with spiculated margins.           Impression   Right breast mass and axillary mass both suspicious of malignancy.  Recommend   core biopsy of both lesions       BIRADS:   BIRADS - CATEGORY 4       Suspicious Abnormality. Biopsy should be considered at this time.       OVERALL ASSESSMENT - SUSPICIOUS       A letter of notification will be sent to the patient regarding the results.       RISK ASSESSMENT:       During this patient's visit, information obtained was used to generate a   lifetime risk assessment using the Tyrer-Cuzick model (also called the HYACINTH   International Breast Cancer Intervention study Breast Cancer Risk Evaluation   Tool).       LIFETIME RISK:       Patient has a Tyrer-Cuzick score of: 14.6%       BREAST TISSUE DENSITY       Heterogeneously dense       AVERAGE RISK ( < 15% Lifetime Risk)     PATHOLOGY:  Diagnosis:   A.  Right breast, 11:00 core needle biopsy: Invasive, poorly   differentiated ductal carcinoma (grade 3)     B.  Right axilla, core needle biopsy: Invasive, poorly differentiated   ductal carcinoma involving fibroadipose tissue; lymph node not identified     Comment:     In part A, the tumor cells are immunoreactive with GATA3. Intradepartmental consultation is obtained. Breast Cancer Marker Studies:     Estrogen Receptors (ER):   -Negative (less than 1%): Internal control cells present and stain as expected: Yes     Progesterone Receptors (KS):   -Negative (less than 1%): Internal control cells present and stain as expected: Yes     Hormone receptor studies are performed by immunohistochemistry on   formalin-fixed, paraffin-embedded tissue (Roche Benchmark Immunostainer,   [place] anti-ER clone [id number], anti-KS clone [id number], polymer-based detection   chemistry). ER and KS are evaluated based on the percentage of cells   showing nuclear staining with >1% considered positive for each.      Her-2/sarah (c-erb B-2) protein expression: Negative/1+     Her-2 studies are performed by immunohistochemistry on formalin-fixed,   paraffin-embedded tissue (Roche Benchmark Immunostainer, [place] Pathway   anti-Her-2 clone [id number], polymer-based detection chemistry). This assay is   FDA approved. Her-2 is evaluated based on the pattern of membrane staining as well as   the percentage of cancer cells showing membrane staining, using the   latest ASCO/CAP scoring criteria. Testing is performed on block A1. All controls (high protein expression,   low protein expression, negative protein expression, and internal) are   acceptable. Percentage of tumor cells exhibiting uniform intense complete membrane   stainin%     Cold Ischemia and Fixation Times:   Meets requirements specified in latest version of the ASCO/CAP   guidelines: Yes     Ki-67 proliferation index: 90%     ASSESSMENT/PLAN:  Right breast cancer, IDC--TNBC, Ki-67 90%; clinical prognostic stage IIIc due to the fixed lymph node  -- CBC, CMP  -- Genetic testing  -- CAT scans chest abdomen pelvis and bone scan  -- Refer to oncology  -- I explained to patient that she needs to be referred to oncology and undergo neoadjuvant chemotherapy first.  I have reviewed her mammograms and the mass seems to be pretty well delineated from the rest of the breast tissue so therefore I do not feel like she needs an MRI. The mass feels true to size on examination as well. I went over Mediport placement with her including the risk benefits and alternatives and she agrees to proceed. I explained to her that if this mass shrinks down in her lymph node shrinks and she becomes clinically negative on examination her axilla she would be a candidate for a lumpectomy with sentinel lymph node biopsy as long as we remove the clipped lymph node during the dissection.     Maxx Calix MD, MSc, FACS  2022  8:38 AM

## 2022-01-14 NOTE — PROGRESS NOTES
Upon educating the patient, she meets criteria for testing of BRCA related mutations implicated in breast and ovarian cancer. This is by virtue of her having a diagnosis of breast cancer combined with either one of the following criteria as described by NCCN guidelines:  Personal history of breast cancer AGE 51, TRIPLE NEGATIVE+ one or more of the following: Diagnosed. FATHER: MELANOMA  MATERNAL GRANDMOTHER: BREAST CANCER  MATERNAL AUNT: BREAST CANCER  MATERNAL AUNT: BREAST CANCER        Pre-Test Education and Risk Assessment During the patient's first visit, the patient has completed the \"Family History Questionnaire\" along with personal information pertinent to assessing risk factors. This information is used to complete the genetic assessment. Informed Consent Procedures Education along with the information guide \"Hereditary Breast and Ovarian Cancer Syndrome, A Patient's Guide to risk assessment\" is provided to the patient with additional resources listed with the information guide. Informed consent is obtained for all genetic testing, and the limitations and benefits of testing are discussed. The specific type of test to be completed, the cost of the tests, possible test results, and the implications of these results are reviewed with the individual. Written consent is obtained prior to testing. Testing  Confidentiality Standards Privacy is maintained in accordance with institutional guidelines. No patient files are coded. Information obtained is kept in a secure medical record. Any information regarding genetic testing cannot be released without the written consent of the individual.   Testing Genetic testing is coordinated and sent to in-house and outside institutions that are CAP/CLIA approved. Available Testing  Cancer/Syndrome Gene  Breast & Ovarian Cancer BRCA1, BRCA2       Blood specimen obtained with today's visit. Educational brochure given to patient to take home.     After considering the risks, benefits, and limitations, the patient chose to pursue and provided informed consent for the following testing:   Integrated BRACAnalysis with [employer] Hereditary Cancer Update Test.    Genetic assessment and education in collaboration with Ruben Brown MD FACS

## 2022-01-14 NOTE — TELEPHONE ENCOUNTER
Met with patient regarding her recent breast cancer diagnosis at Dr. Imani Dotson surgical consultation appointment. Instructed on next steps including breast surgery options and possible additional imaging per Dr. Imani Dotson recommendations. Provided patient with \"Be A Survivor: Your guide to breast cancer treatment\", chapter 4 reviewed. Patient states that she is still waiting for packet. Patient provided with resources and links for cancer information. Patient given opportunity to ask questions. Provided patient with my contact information, and encouragement to call me with questions or concerns. Patient verbalizes understanding and appreciative of nurse navigator visit.

## 2022-01-19 NOTE — TELEPHONE ENCOUNTER
GEORGE Arizmendi called and spoke to Shannen and scheduled PT for left side mediport placement on 2022 @ 9am with Dr. Gayle Zuñiga. PT is not taking ASA/blood thinner products. PT has not received both COVID 19 vaccines. PT verbalized she understood prep instructions, and NPO after midnight. PT verbalized that she understood appointment date/time, as well as to arrive 2 hours prior to procedure. PT advised PAT will call to go over all medication and advise on where to park and enter the hospital at for procedure. PT has been told to make sure they have a ride to and from procedure as they are not allowed to drive after procedure. MA instructed PT to call the office at [phone] with any questions, comments, or concerns about the procedure. Prior Authorization Form:      DEMOGRAPHICS:                     Patient Name:  Ruperto Osler  Patient :  1975            Insurance:  Payor: [employer] / Plan: [employer] - [place] PPO / Product Type: *No Product type* /   Insurance ID Number:    Payor/Plan Subscr  Sex Relation Sub. Ins. ID Effective Group Num   1.  [id number] 10/21/1970 Male Spouse [id number] 20 [id number]                                    [id number]         DIAGNOSIS & PROCEDURE:                       Procedure/Operation: left side medi-port placement           CPT Code: 25955    Diagnosis:  Breast CA    ICD10 Code: T22.976    [facility]  [facility]    Surgeon:  Diann Ramsay INFORMATION:                          Date: 2022    Time: 9am              Anesthesia:  MAC/TIVA                                                       Status:  Outpatient        Special Comments:  N/A       Electronically signed by Francisco Vasquez MA on 2022 at 2:23 PM

## 2022-01-19 NOTE — PROGRESS NOTES
[address] PRE-ADMISSION TESTING GENERAL INSTRUCTIONS- [facility]-phone number:[phone]    GENERAL INSTRUCTIONS  [x] Antibacterial Soap shower Night before and/or AM of Surgery    [x] Nothing by mouth after midnight, including gum, candy, mints, or water. [x] You may brush your teeth, gargle, but do NOT swallow water. [x]No smoking, chewing tobacco, illegal drugs, marijuana or alcohol within 24 hours of your surgery. [x] Jewelry, valuables or body piercing's should not be brought to the hospital. All body and/or tongue piercing's must be removed prior to arriving to hospital.  ALL hair pins must be removed. [x] Do not wear makeup, lotions, powders, deodorant. Nail polish as directed by the nurse. [x] Arrange transportation with a responsible adult  to and from the hospital. If you do not have a responsible adult  to transport you, you will need to make arrangements with a medical transportation company (i.e. [employer]. A Uber/taxi/bus is not appropriate unless you are accompanied by a responsible adult ). Arrange for someone to be with you for the remainder of the day and for 24 hours after your procedure due to having had anesthesia. Who will be your  for transportation? Rudsheyphkatya Rouse, spouse  Who will be staying with you for 24 hrs after your procedure? Rudsheytavia Sandovales, spouse  [x] [place] card and photo ID. [x] Bring urine specimen day of surgery. Any small container is acceptable. PARKING INSTRUCTIONS:   [x] Arrival Time: 7 am, you and your  will need to wear a mask. · [x] Parking lot '\"I\"  is located on [facility] (the corner of [facility]). To enter, press the button and the gate will lift. A free token will be provided to exit the lot. One car per patient is allowed to park in this lot. All other cars are to park on [address] [place] [address] either in the parking garage or the handicap lot.       Walk up the front walk to the [facility], the door will be locked an employee will greet you and let you in. One person may accompany you. Wear a mask. EDUCATION INSTRUCTIONS:        [x]Fluoroscopy-Xray used in surgery reviewed with patient. Educational pamphlet placed in chart. [x]Pain: Post-op pain is normal and to be expected. You will be asked to rate your pain from 0-10 (a zero is not acceptable-education is needed). Your post-op pain goal is:   [x] Ask your nurse for your pain medication. MEDICATION INSTRUCTIONS:  [x]Bring a complete list of your medications, please write the last time you took the medicine, give this list to the nurse. [x] Take the following medications the morning of surgery with 1-2 ounces of water:  fluoxetine      [x] Stop herbal supplements, ibuprofen (Nsaids)  and vitamins 5 days before your surgery. [x] Follow physician instructions regarding any blood thinners you may be taking. WHAT TO EXPECT:  [x] The day of surgery you will be greeted and checked in by the [employer]. Please bring your photo ID and insurance card. A nurse will greet you in accordance to the time you are needed in the pre-op area to prepare you for surgery. Please do not be discouraged if you are not greeted in the order you arrive as there are many variables that are involved in patient preparation. Your patience is greatly appreciated as you wait for your nurse. Please bring in items such as: books, magazines, newspapers, electronics, or any other items  to occupy your time in the waiting area. [x]  Delays may occur with surgery and staff will make a sincere effort to keep you informed of delays. If any delays occur with your procedure, we apologize ahead of time for your inconvenience as we recognize the value of your time.

## 2022-01-19 NOTE — PROGRESS NOTES
Patient agreed to COVID test on 1/21 at the  [facility], between the hours of 7 am- 3 pm, second floor located at  [facility]. Patient instructed to bring ID. Patient instructed to self isolate until day of surgery.

## 2022-01-19 NOTE — TELEPHONE ENCOUNTER
GEORGE Chavarria contacted [facility] for prior authorization of outpatient imaging. A prior authorization needed for CT C/A/P(64055 & [id number]) at [facility] in [place]. NM Whole body (90312) needs no auth. MA Spoke with Pb Ford, authorization Specialist. Authorization number [id number], good from 01/19/2022-03/19/2022. MA scanned authorization form in media tab. GEORGE Chavarria called and spoke to [facility] and scheduled PT for NM whole body and CT C/A/P on 01/28/2022 @ [facility]. NM injection @ 10am CT C @ 11am CT A/P @ 11:30am and NM scan @ 1pm. PT verbalized she understood prep instructions, and NPO after midnight. PT verbalized that she understood appointment date/time, as well as to arrive 30 minutes prior to procedure. PT advised on where to park and enter the hospital at for procedure. MA instructed PT to call the office at [phone] with any questions, comments, or concerns about the procedure.          Electronically signed by Scott Knowles MA on 1/19/22 at 2:24 PM EST

## 2022-01-24 NOTE — H&P (VIEW-ONLY)
[facility]  HISTORY & PHYSICAL  ATTENDING NOTE     Patient's Name/Date of Birth: Ruperto Osler / 1975     Date: 2022           Chief Complaint   Patient presents with    Consultation       NEW BREAST CANCER:  Right breast -- Invasive ductal carcinoma -- ER(-) MD(-) HER2(-)  imaging/biopsy [facility]  --- Referring Dr Jarek Haywood       breast mass - painful, states up into axillary, at worst 5-6/10 - states the skin looks different where the mass is          Ruperto Osler presents for evaluation of a mammographic abnormality.     PCP: Sol Walker MD. Gynecologist: Dr. Alisa Henriquez.     Referred by:  Dr. Greyson Arambula mammogram was performed at [facility] on 2021. The patient has noted a change in BSE since presentation. Breast mass present x 2 months  Patient denies nipple discharge. Patient denies a personal history of breast cancer. Breast cancer risk factors include family hx on mother's side and age and gender. Ashkenazi Cheondoism Ancestry: No.     OBSTETRIC RELATED HISTORY:  Age of menarche was 14. Age of menopause --may be perimenopausal; has menses for 3 weeks out of the month  Patient denies hormonal therapy. OCPs years ago, got headaches--not even a year; had norplant in 4 years and had BC shots   Patient is . Age of first live birth was 15. Patient did not breast feed. Is patient interested in fertility information about fertility preservation? No     CANCER SURVEILLANCE HISTORY:  Mammograms: No   Breast MRI's: No   Breast Biopsies: Yes   Colonoscopy: No has it scheduled  GI Polyps: Not Applicable   EGD: No   Pelvic Exam: Yes   Pap Smear: Yes   Dermatology: No   Lung screening: no  H/O DVT:  no  H/O Radiation:  no           Estimated body mass index is 32.88 kg/m² as calculated from the following:    Height as of this encounter: 5' 1\" (1.549 m). Weight as of this encounter: 174 lb (78.9 kg).   Bra Size: 38C     Because violence is so common, we ask all our patients: are you in a relationship or do you live with a person who threatens, hurts, or controls you:  no     Patient drinks moderate caffeinated beverages. Patient does not smoke cigarettes. Patient does use recreational drugs.  Medical marijuana--shattered right ankle 5 year ago.         Past Medical History        Past Medical History:   Diagnosis Date    Anxiety      Headache              Past Surgical History         Past Surgical History:   Procedure Laterality Date    ANKLE SURGERY        DILATION AND CURETTAGE OF UTERUS        ENDOMETRIAL ABLATION        YADIEL US PERQ DEVICE SOFT TISSUE PLMT  FIRST LESION   1/6/2022     [facility] US PERQ DEVICE SOFT TISSUE PLMT  FIRST LESION 1/6/2022 [facility]    TUBAL LIGATION   05/2021    US BREAST NEEDLE BIOPSY RIGHT Right 1/6/2022     US BREAST NEEDLE BIOPSY RIGHT 1/6/2022 [facility]            Current Facility-Administered Medications          Current Outpatient Medications   Medication Sig Dispense Refill    FLUoxetine (PROZAC) 20 MG capsule Take 20 mg by mouth daily         Cholecalciferol 50 MCG (2000 UT) CHEW Take 1 capsule by mouth daily           No current facility-administered medications for this visit.            Family History         Family History   Problem Relation Age of Onset    No Known Problems Mother      Colon Cancer Father      Diabetes Father      High Blood Pressure Father      Alzheimer's Disease Father      Dementia Father      Cancer Father           Melanoma     Breast Cancer Maternal Aunt      Breast Cancer Maternal Grandmother      Breast Cancer Maternal Aunt           Ashkenazi Anglican Ancestry: No          Allergies   Allergen Reactions    Penicillins           Social History               Socioeconomic History    Marital status:        Spouse name: Not on file    Number of children: Not on file    Years of education: Not on file    Highest education level: Not on file Occupational History    Not on file   Tobacco Use    Smoking status: Former Smoker       Packs/day: 0.50       Years: 15.00       Pack years: 7.50       Types: Cigarettes       Start date: 11       Quit date: 2018       Years since quittin.0    Smokeless tobacco: Never Used    Tobacco comment: Con't no smoking    Vaping Use    Vaping Use: Never used   Substance and Sexual Activity    Alcohol use: No    Drug use: Yes       Types: Marijuana Veldon Bunting)       Comment: Medical     Sexual activity: Yes       Partners: Male   Other Topics Concern    Not on file   Social History Narrative    Not on file      Social Determinants of Health          Financial Resource Strain: Low Risk     Difficulty of Paying Living Expenses: Not hard at all   Food Insecurity: No Food Insecurity    Worried About Running Out of Food in the Last Year: Never true    Rupa of Food in the Last Year: Never true   Transportation Needs: No Transportation Needs    Lack of Transportation (Medical): No    Lack of Transportation (Non-Medical):  No   Physical Activity:     Days of Exercise per Week: Not on file    Minutes of Exercise per Session: Not on file   Stress:     Feeling of Stress : Not on file   Social Connections:     Frequency of Communication with Friends and Family: Not on file    Frequency of Social Gatherings with Friends and Family: Not on file    Attends Temple Services: Not on file    Active Member of [address] [place] [address] or Organizations: Not on file    Attends Club or Organization Meetings: Not on file    Marital Status: Not on file   Intimate Partner Violence:     Fear of Current or Ex-Partner: Not on file    Emotionally Abused: Not on file    Physically Abused: Not on file    Sexually Abused: Not on file   Housing Stability:     Unable to Pay for Housing in the Last Year: Not on file    Number of [place] in the Last Year: Not on file    Unstable Housing in the Last Year: Not on file            Occupation: hardware store     Review of Systems   Constitutional: Positive for unexpected weight change (increased last year; has started losing a little weight). Negative for activity change and appetite change. Has occasional night sweats     HENT: Negative. Eyes: Negative. Respiratory: Negative. Negative for cough and shortness of breath. Cardiovascular: Negative. Negative for chest pain and leg swelling. Gastrointestinal: Positive for nausea (occasional). Negative for abdominal distention, abdominal pain, anal bleeding, blood in stool, constipation, diarrhea and vomiting. Endocrine: Negative. Genitourinary: Negative. Musculoskeletal: Negative. Negative for arthralgias, back pain, gait problem, joint swelling and myalgias. Skin: Negative. Allergic/Immunologic: Negative. Neurological: Positive for headaches (has been getting more headaches, but attributes to mask use--was at home for a long period of time, just started back to work). Negative for dizziness and weakness. Hematological: Negative. Psychiatric/Behavioral: Negative. Negative for confusion, decreased concentration and sleep disturbance.         ECOG PS:  0  Covid vaccination? No  Flu Vaccination? No     BP (!) 142/68 (Site: Left Upper Arm, Position: Sitting, Cuff Size: Large Adult)   Pulse 90   Temp 98.4 °F (36.9 °C) (Infrared)   Ht 5' 1\" (1.549 m)   Wt 174 lb (78.9 kg)   SpO2 98%   BMI 32.88 kg/m²   Physical Exam  Constitutional:       Appearance: Normal appearance. She is obese. HENT:      Head: Normocephalic and atraumatic. Nose: Nose normal.      Mouth/Throat:      Mouth: Mucous membranes are moist.      Pharynx: Oropharynx is clear. Eyes:      Extraocular Movements: Extraocular movements intact. Pupils: Pupils are equal, round, and reactive to light. Cardiovascular:      Rate and Rhythm: Normal rate and regular rhythm. Pulses: Normal pulses. Heart sounds: Normal heart sounds. Pulmonary:      Effort: Pulmonary effort is normal.      Breath sounds: Normal breath sounds. Chest:   Breasts:      Right: Inverted nipple (slight inversion), mass, tenderness and axillary adenopathy present. No swelling, bleeding, nipple discharge, skin change or supraclavicular adenopathy. Left: No swelling, bleeding, inverted nipple, mass, nipple discharge, skin change, tenderness, axillary adenopathy or supraclavicular adenopathy.          Abdominal:      General: There is no distension. Palpations: Abdomen is soft. Tenderness: There is no abdominal tenderness. Musculoskeletal:         General: No tenderness or signs of injury. Cervical back: Normal range of motion and neck supple. Lymphadenopathy:      Cervical: No cervical adenopathy. Right cervical: No superficial cervical adenopathy. Left cervical: No superficial cervical adenopathy. Upper Body:      Right upper body: Axillary adenopathy present. No supraclavicular adenopathy. Left upper body: No supraclavicular or axillary adenopathy. Skin:     General: Skin is warm and dry. Neurological:      General: No focal deficit present. Mental Status: She is alert and oriented to person, place, and time. Psychiatric:         Mood and Affect: Mood normal.         Behavior: Behavior normal.         Thought Content:  Thought content normal.         Judgment: Judgment normal.            MAMMOGRAM:  EXAMINATION:   DIAGNOSTIC DIGITAL BILATERAL BREASTS MAMMOGRAM WITH TOMOSYNTHESIS; TARGETED   ULTRASOUND OF THE RIGHT BREAST, 12/29/2021 1:03 pm       TECHNIQUE:   Diagnostic mammography of the bilateral breasts was performed with   tomosynthesis.  2D standard and 3D tomosynthesis combination imaging   performed through both breasts.  Computer aided detection was utilized in the   interpretation of this exam.; Targeted ultrasound of the right breast was   performed.       Views:       COMPARISON:   None       HISTORY: ORDERING SYSTEM PROVIDED HISTORY: Breast mass, right   TECHNOLOGIST PROVIDED HISTORY:   Reason for exam:->Breast mass, right       FINDINGS:   In the upper outer quadrant of the right breast there is a heterogeneous   ill-defined mass measuring 2.5 cm.  Left breast is unremarkable.       Ultrasound demonstrates a solid hypoechoic mass with multiple lobular margins   and some spiculation.  It maximally measures 2.3 cm.       There is a 1.9 cm mass in the right axilla with spiculated margins.           Impression   Right breast mass and axillary mass both suspicious of malignancy.  Recommend   core biopsy of both lesions       BIRADS:   BIRADS - CATEGORY 4       Suspicious Abnormality.  Biopsy should be considered at this time.       OVERALL ASSESSMENT - SUSPICIOUS       A letter of notification will be sent to the patient regarding the results.       RISK ASSESSMENT:       During this patient's visit, information obtained was used to generate a   lifetime risk assessment using the Tyrer-Cuzick model (also called the HYACINTH   International Breast Cancer Intervention study Breast Cancer Risk Evaluation   Tool).       LIFETIME RISK:       Patient has a Tyrer-Cuzick score of: 14.6%       BREAST TISSUE DENSITY       Heterogeneously dense               ULTRASOUND:  EXAMINATION:   DIAGNOSTIC DIGITAL BILATERAL BREASTS MAMMOGRAM WITH TOMOSYNTHESIS; TARGETED   ULTRASOUND OF THE RIGHT BREAST, 12/29/2021 1:03 pm       TECHNIQUE:   Diagnostic mammography of the bilateral breasts was performed with   tomosynthesis.  2D standard and 3D tomosynthesis combination imaging   performed through both breasts.  Computer aided detection was utilized in the   interpretation of this exam.; Targeted ultrasound of the right breast was   performed.       Views:       COMPARISON:   None       HISTORY:   ORDERING SYSTEM PROVIDED HISTORY: Breast mass, right   TECHNOLOGIST PROVIDED HISTORY:   Reason for exam:->Breast mass, right       FINDINGS:   In the upper outer quadrant of the right breast there is a heterogeneous   ill-defined mass measuring 2.5 cm.  Left breast is unremarkable.       Ultrasound demonstrates a solid hypoechoic mass with multiple lobular margins   and some spiculation.  It maximally measures 2.3 cm.       There is a 1.9 cm mass in the right axilla with spiculated margins.           Impression   Right breast mass and axillary mass both suspicious of malignancy.  Recommend   core biopsy of both lesions       BIRADS:   BIRADS - CATEGORY 4       Suspicious Abnormality. Biopsy should be considered at this time.       OVERALL ASSESSMENT - SUSPICIOUS       A letter of notification will be sent to the patient regarding the results.       RISK ASSESSMENT:       During this patient's visit, information obtained was used to generate a   lifetime risk assessment using the Tyrer-Cuzick model (also called the HYACINTH   International Breast Cancer Intervention study Breast Cancer Risk Evaluation   Tool).       LIFETIME RISK:       Patient has a Tyrer-Cuzick score of: 14.6%       BREAST TISSUE DENSITY       Heterogeneously dense       AVERAGE RISK ( < 15% Lifetime Risk)      PATHOLOGY:  Diagnosis:   A.  Right breast, 11:00 core needle biopsy: Invasive, poorly   differentiated ductal carcinoma (grade 3)     B.  Right axilla, core needle biopsy: Invasive, poorly differentiated   ductal carcinoma involving fibroadipose tissue; lymph node not identified     Comment:     In part A, the tumor cells are immunoreactive with GATA3. Intradepartmental consultation is obtained. Breast Cancer Marker Studies:     Estrogen Receptors (ER):   -Negative (less than 1%): Internal control cells present and stain as expected: Yes     Progesterone Receptors (NH):   -Negative (less than 1%):    Internal control cells present and stain as expected: Yes     Hormone receptor studies are performed by immunohistochemistry on   formalin-fixed, paraffin-embedded tissue (Roche Benchmark Immunostainer,   Sherrard anti-ER clone [id number], anti-AZ clone [id number], polymer-based detection   chemistry). ER and AZ are evaluated based on the percentage of cells   showing nuclear staining with >1% considered positive for each. Her-2/sarah (c-erb B-2) protein expression: Negative/1+     Her-2 studies are performed by immunohistochemistry on formalin-fixed,   paraffin-embedded tissue (Roche Benchmark Immunostainer, Sherrard Pathway   anti-Her-2 clone [id number], polymer-based detection chemistry). This assay is   FDA approved. Her-2 is evaluated based on the pattern of membrane staining as well as   the percentage of cancer cells showing membrane staining, using the   latest ASCO/CAP scoring criteria. Testing is performed on block A1. All controls (high protein expression,   low protein expression, negative protein expression, and internal) are   acceptable. Percentage of tumor cells exhibiting uniform intense complete membrane   stainin%     Cold Ischemia and Fixation Times:   Meets requirements specified in latest version of the ASCO/CAP   guidelines: Yes     Ki-67 proliferation index: 90%      ASSESSMENT/PLAN:  Right breast cancer, IDC--TNBC, Ki-67 90%; clinical prognostic stage IIIc due to the fixed lymph node  -- CBC, CMP  -- Genetic testing  -- CAT scans chest abdomen pelvis and bone scan  -- Refer to oncology  -- I explained to patient that she needs to be referred to oncology and undergo neoadjuvant chemotherapy first.  I have reviewed her mammograms and the mass seems to be pretty well delineated from the rest of the breast tissue so therefore I do not feel like she needs an MRI. The mass feels true to size on examination as well. I went over Mediport placement with her including the risk benefits and alternatives and she agrees to proceed.   I explained to her that if this mass shrinks down in her lymph node shrinks and she becomes clinically negative on examination her axilla she would be a candidate for a lumpectomy with sentinel lymph node biopsy as long as we remove the clipped lymph node during the dissection.     Plan for mediport today  Che Nolan MD, MSc, [facility]  1/14/2022  8:38 AM

## 2022-01-24 NOTE — H&P
[facility]  HISTORY & PHYSICAL  ATTENDING NOTE     Patient's Name/Date of Birth: Jeana Campbell / 1975     Date: 2022           Chief Complaint   Patient presents with    Consultation       NEW BREAST CANCER:  Right breast -- Invasive ductal carcinoma -- ER(-) IL(-) HER2(-)  imaging/biopsy [facility]  --- Referring Dr Irineo Singh       breast mass - painful, states up into axillary, at worst 5-6/10 - states the skin looks different where the mass is          Jeana Campbell presents for evaluation of a mammographic abnormality.     PCP: Madeline Thomas MD. Gynecologist: Dr. Delilah Mariano.     Referred by:  Dr. Lily Guo mammogram was performed at [facility] on 2021. The patient has noted a change in BSE since presentation. Breast mass present x 2 months  Patient denies nipple discharge. Patient denies a personal history of breast cancer. Breast cancer risk factors include family hx on mother's side and age and gender. Ashkenazi Quaker Ancestry: No.     OBSTETRIC RELATED HISTORY:  Age of menarche was 14. Age of menopause --may be perimenopausal; has menses for 3 weeks out of the month  Patient denies hormonal therapy. OCPs years ago, got headaches--not even a year; had norplant in 4 years and had BC shots   Patient is . Age of first live birth was 15. Patient did not breast feed. Is patient interested in fertility information about fertility preservation? No     CANCER SURVEILLANCE HISTORY:  Mammograms: No   Breast MRI's: No   Breast Biopsies: Yes   Colonoscopy: No has it scheduled  GI Polyps: Not Applicable   EGD: No   Pelvic Exam: Yes   Pap Smear: Yes   Dermatology: No   Lung screening: no  H/O DVT:  no  H/O Radiation:  no           Estimated body mass index is 32.88 kg/m² as calculated from the following:    Height as of this encounter: 5' 1\" (1.549 m). Weight as of this encounter: 174 lb (78.9 kg).   Bra Size: 38C     Because violence is so common, we ask all our patients: are you in a relationship or do you live with a person who threatens, hurts, or controls you:  no     Patient drinks moderate caffeinated beverages. Patient does not smoke cigarettes. Patient does use recreational drugs.  Medical marijuana--shattered right ankle 5 year ago.         Past Medical History        Past Medical History:   Diagnosis Date    Anxiety      Headache              Past Surgical History         Past Surgical History:   Procedure Laterality Date    ANKLE SURGERY        DILATION AND CURETTAGE OF UTERUS        ENDOMETRIAL ABLATION        YADIEL US PERQ DEVICE SOFT TISSUE PLMT  FIRST LESION   1/6/2022     [facility] US PERQ DEVICE SOFT TISSUE PLMT  FIRST LESION 1/6/2022 [facility]    TUBAL LIGATION   05/2021    US BREAST NEEDLE BIOPSY RIGHT Right 1/6/2022     US BREAST NEEDLE BIOPSY RIGHT 1/6/2022 [facility]            Current Facility-Administered Medications          Current Outpatient Medications   Medication Sig Dispense Refill    FLUoxetine (PROZAC) 20 MG capsule Take 20 mg by mouth daily         Cholecalciferol 50 MCG (2000 UT) CHEW Take 1 capsule by mouth daily           No current facility-administered medications for this visit.            Family History         Family History   Problem Relation Age of Onset    No Known Problems Mother      Colon Cancer Father      Diabetes Father      High Blood Pressure Father      Alzheimer's Disease Father      Dementia Father      Cancer Father           Melanoma     Breast Cancer Maternal Aunt      Breast Cancer Maternal Grandmother      Breast Cancer Maternal Aunt           Ashkenazi Sabianism Ancestry: No          Allergies   Allergen Reactions    Penicillins           Social History               Socioeconomic History    Marital status:        Spouse name: Not on file    Number of children: Not on file    Years of education: Not on file    Highest education level: Not on file Occupational History    Not on file   Tobacco Use    Smoking status: Former Smoker       Packs/day: 0.50       Years: 15.00       Pack years: 7.50       Types: Cigarettes       Start date: 11       Quit date: 2018       Years since quittin.0    Smokeless tobacco: Never Used    Tobacco comment: Con't no smoking    Vaping Use    Vaping Use: Never used   Substance and Sexual Activity    Alcohol use: No    Drug use: Yes       Types: Marijuana [employer])       Comment: Medical     Sexual activity: Yes       Partners: Male   Other Topics Concern    Not on file   Social History Narrative    Not on file      Social Determinants of Health          Financial Resource Strain: Low Risk     Difficulty of Paying Living Expenses: Not hard at all   Food Insecurity: No Food Insecurity    Worried About Running Out of Food in the Last Year: Never true    Rupa of Food in the Last Year: Never true   Transportation Needs: No Transportation Needs    Lack of Transportation (Medical): No    Lack of Transportation (Non-Medical):  No   Physical Activity:     Days of Exercise per Week: Not on file    Minutes of Exercise per Session: Not on file   Stress:     Feeling of Stress : Not on file   Social Connections:     Frequency of Communication with Friends and Family: Not on file    Frequency of Social Gatherings with Friends and Family: Not on file    Attends Sabianist Services: Not on file    Active Member of [address] [place] [address] or Organizations: Not on file    Attends Club or Organization Meetings: Not on file    Marital Status: Not on file   Intimate Partner Violence:     Fear of Current or Ex-Partner: Not on file    Emotionally Abused: Not on file    Physically Abused: Not on file    Sexually Abused: Not on file   Housing Stability:     Unable to Pay for Housing in the Last Year: Not on file    Number of [place] in the Last Year: Not on file    Unstable Housing in the Last Year: Not on file            Occupation: Pulmonary:      Effort: Pulmonary effort is normal.      Breath sounds: Normal breath sounds. Chest:   Breasts:      Right: Inverted nipple (slight inversion), mass, tenderness and axillary adenopathy present. No swelling, bleeding, nipple discharge, skin change or supraclavicular adenopathy. Left: No swelling, bleeding, inverted nipple, mass, nipple discharge, skin change, tenderness, axillary adenopathy or supraclavicular adenopathy.          Abdominal:      General: There is no distension. Palpations: Abdomen is soft. Tenderness: There is no abdominal tenderness. Musculoskeletal:         General: No tenderness or signs of injury. Cervical back: Normal range of motion and neck supple. Lymphadenopathy:      Cervical: No cervical adenopathy. Right cervical: No superficial cervical adenopathy. Left cervical: No superficial cervical adenopathy. Upper Body:      Right upper body: Axillary adenopathy present. No supraclavicular adenopathy. Left upper body: No supraclavicular or axillary adenopathy. Skin:     General: Skin is warm and dry. Neurological:      General: No focal deficit present. Mental Status: She is alert and oriented to person, place, and time. Psychiatric:         Mood and Affect: Mood normal.         Behavior: Behavior normal.         Thought Content:  Thought content normal.         Judgment: Judgment normal.            MAMMOGRAM:  EXAMINATION:   DIAGNOSTIC DIGITAL BILATERAL BREASTS MAMMOGRAM WITH TOMOSYNTHESIS; TARGETED   ULTRASOUND OF THE RIGHT BREAST, 12/29/2021 1:03 pm       TECHNIQUE:   Diagnostic mammography of the bilateral breasts was performed with   tomosynthesis.  2D standard and 3D tomosynthesis combination imaging   performed through both breasts.  Computer aided detection was utilized in the   interpretation of this exam.; Targeted ultrasound of the right breast was   performed.       Views:       COMPARISON:   None       HISTORY: ORDERING SYSTEM PROVIDED HISTORY: Breast mass, right   TECHNOLOGIST PROVIDED HISTORY:   Reason for exam:->Breast mass, right       FINDINGS:   In the upper outer quadrant of the right breast there is a heterogeneous   ill-defined mass measuring 2.5 cm.  Left breast is unremarkable.       Ultrasound demonstrates a solid hypoechoic mass with multiple lobular margins   and some spiculation.  It maximally measures 2.3 cm.       There is a 1.9 cm mass in the right axilla with spiculated margins.           Impression   Right breast mass and axillary mass both suspicious of malignancy.  Recommend   core biopsy of both lesions       BIRADS:   BIRADS - CATEGORY 4       Suspicious Abnormality.  Biopsy should be considered at this time.       OVERALL ASSESSMENT - SUSPICIOUS       A letter of notification will be sent to the patient regarding the results.       RISK ASSESSMENT:       During this patient's visit, information obtained was used to generate a   lifetime risk assessment using the Tyrer-Cuzick model (also called the HYACINTH   International Breast Cancer Intervention study Breast Cancer Risk Evaluation   Tool).       LIFETIME RISK:       Patient has a Tyrer-Cuzick score of: 14.6%       BREAST TISSUE DENSITY       Heterogeneously dense               ULTRASOUND:  EXAMINATION:   DIAGNOSTIC DIGITAL BILATERAL BREASTS MAMMOGRAM WITH TOMOSYNTHESIS; TARGETED   ULTRASOUND OF THE RIGHT BREAST, 12/29/2021 1:03 pm       TECHNIQUE:   Diagnostic mammography of the bilateral breasts was performed with   tomosynthesis.  2D standard and 3D tomosynthesis combination imaging   performed through both breasts.  Computer aided detection was utilized in the   interpretation of this exam.; Targeted ultrasound of the right breast was   performed.       Views:       COMPARISON:   None       HISTORY:   ORDERING SYSTEM PROVIDED HISTORY: Breast mass, right   TECHNOLOGIST PROVIDED HISTORY:   Reason for exam:->Breast mass, right       FINDINGS:   In the upper outer quadrant of the right breast there is a heterogeneous   ill-defined mass measuring 2.5 cm.  Left breast is unremarkable.       Ultrasound demonstrates a solid hypoechoic mass with multiple lobular margins   and some spiculation.  It maximally measures 2.3 cm.       There is a 1.9 cm mass in the right axilla with spiculated margins.           Impression   Right breast mass and axillary mass both suspicious of malignancy.  Recommend   core biopsy of both lesions       BIRADS:   BIRADS - CATEGORY 4       Suspicious Abnormality. Biopsy should be considered at this time.       OVERALL ASSESSMENT - SUSPICIOUS       A letter of notification will be sent to the patient regarding the results.       RISK ASSESSMENT:       During this patient's visit, information obtained was used to generate a   lifetime risk assessment using the Tyrer-Cuzick model (also called the HYACINTH   International Breast Cancer Intervention study Breast Cancer Risk Evaluation   Tool).       LIFETIME RISK:       Patient has a Tyrer-Cuzick score of: 14.6%       BREAST TISSUE DENSITY       Heterogeneously dense       AVERAGE RISK ( < 15% Lifetime Risk)      PATHOLOGY:  Diagnosis:   A.  Right breast, 11:00 core needle biopsy: Invasive, poorly   differentiated ductal carcinoma (grade 3)     B.  Right axilla, core needle biopsy: Invasive, poorly differentiated   ductal carcinoma involving fibroadipose tissue; lymph node not identified     Comment:     In part A, the tumor cells are immunoreactive with GATA3. Intradepartmental consultation is obtained. Breast Cancer Marker Studies:     Estrogen Receptors (ER):   -Negative (less than 1%): Internal control cells present and stain as expected: Yes     Progesterone Receptors (AZ):   -Negative (less than 1%):    Internal control cells present and stain as expected: Yes     Hormone receptor studies are performed by immunohistochemistry on   formalin-fixed, paraffin-embedded tissue (Roche Benchmark Immunostainer,   Belva anti-ER clone [id number], anti-NV clone [id number], polymer-based detection   chemistry). ER and NV are evaluated based on the percentage of cells   showing nuclear staining with >1% considered positive for each. Her-2/sarah (c-erb B-2) protein expression: Negative/1+     Her-2 studies are performed by immunohistochemistry on formalin-fixed,   paraffin-embedded tissue (Roche Benchmark Immunostainer, Belva Pathway   anti-Her-2 clone [id number], polymer-based detection chemistry). This assay is   FDA approved. Her-2 is evaluated based on the pattern of membrane staining as well as   the percentage of cancer cells showing membrane staining, using the   latest ASCO/CAP scoring criteria. Testing is performed on block A1. All controls (high protein expression,   low protein expression, negative protein expression, and internal) are   acceptable. Percentage of tumor cells exhibiting uniform intense complete membrane   stainin%     Cold Ischemia and Fixation Times:   Meets requirements specified in latest version of the ASCO/CAP   guidelines: Yes     Ki-67 proliferation index: 90%      ASSESSMENT/PLAN:  Right breast cancer, IDC--TNBC, Ki-67 90%; clinical prognostic stage IIIc due to the fixed lymph node  -- CBC, CMP  -- Genetic testing  -- CAT scans chest abdomen pelvis and bone scan  -- Refer to oncology  -- I explained to patient that she needs to be referred to oncology and undergo neoadjuvant chemotherapy first.  I have reviewed her mammograms and the mass seems to be pretty well delineated from the rest of the breast tissue so therefore I do not feel like she needs an MRI. The mass feels true to size on examination as well. I went over Mediport placement with her including the risk benefits and alternatives and she agrees to proceed.   I explained to her that if this mass shrinks down in her lymph node shrinks and she becomes clinically negative on examination her axilla she would be a candidate for a lumpectomy with sentinel lymph node biopsy as long as we remove the clipped lymph node during the dissection.     Plan for mediport today  Jose Carlos Aviles MD, MSc, [facility]  1/14/2022  8:38 AM

## 2022-01-26 NOTE — ANESTHESIA PRE PROCEDURE
Department of Anesthesiology  Preprocedure Note       Name:  Keyur Zapata   Age:  51 y.o.  :  1975                                          MRN:  [id number]         Date:  2022      Surgeon: Aruna Mcclain):  Danilo Gutierrez MD    Procedure: Procedure(s): MEDIPORT INSERTION    Medications prior to admission:   Prior to Admission medications    Medication Sig Start Date End Date Taking? Authorizing Provider   ibuprofen (ADVIL;MOTRIN) 600 MG tablet Take 600 mg by mouth every 6 hours as needed for Pain   Yes Historical Provider, MD   FLUoxetine (PROZAC) 20 MG capsule Take 20 mg by mouth daily  20  Yes Historical Provider, MD   Cholecalciferol 50 MCG (2000) CHEW Take 1 capsule by mouth daily     Historical Provider, MD       Current medications:    Current Facility-Administered Medications   Medication Dose Route Frequency Provider Last Rate Last Admin    0.9 % sodium chloride infusion   IntraVENous Continuous Danilo Gutierrez  mL/hr at 22 0755 New Bag at 22 0755    0.9 % sodium chloride infusion  25 mL IntraVENous PRN Danilo Gutierrez MD        ceFAZolin (ANCEF) 2000 mg in sterile water 20 mL IV syringe  2,000 mg IntraVENous On Call to [address], MD        sodium chloride flush 0.9 % injection 10 mL  10 mL IntraVENous 2 times per day Danilo Gutierrez MD        sodium chloride flush 0.9 % injection 10 mL  10 mL IntraVENous PRN Danilo Gutierrez MD           Allergies:     Allergies   Allergen Reactions    Penicillins        Problem List:    Patient Active Problem List   Diagnosis Code    Malignant neoplasm of upper-outer quadrant of right breast in female, estrogen receptor negative ([facility] C50.411, Z17.1       Past Medical History:        Diagnosis Date    Anxiety     Cancer ([facility] 2022    Right Breast    Headache        Past Surgical History:        Procedure Laterality Date    ANKLE SURGERY Right     DILATION AND CURETTAGE OF UTERUS      ENDOMETRIAL ABLATION 2021    YADIEL US PERQ DEVICE SOFT TISSUE PLMT  FIRST LESION  2022    YADIEL US PERQ DEVICE SOFT TISSUE PLMT  FIRST LESION 2022 [facility]    TUBAL LIGATION  2021    US BREAST NEEDLE BIOPSY RIGHT Right 2022    US BREAST NEEDLE BIOPSY RIGHT 2022 [facility]       Social History:    Social History     Tobacco Use    Smoking status: Former Smoker     Packs/day: 0.50     Years: 15.00     Pack years: 7.50     Types: Cigarettes     Start date:      Quit date: 2018     Years since quittin.0    Smokeless tobacco: Never Used    Tobacco comment: Con't no smoking    Substance Use Topics    Alcohol use: Yes     Comment: Very rarely has a drink                                Counseling given: Not Answered  Comment: Con't no smoking       Vital Signs (Current):   Vitals:    22 1513 22 0740   BP:  (!) 153/81   Pulse:  74   Resp:  20   Temp:  36.8 °C (98.3 °F)   TempSrc:  Temporal   SpO2:  98%   Weight: 172 lb (78 kg) 172 lb (78 kg)   Height: 5' 1\" (1.549 m) 5' 1\" (1.549 m)                                              BP Readings from Last 3 Encounters:   22 (!) 153/81   22 (!) 142/68   21 116/74       NPO Status: Time of last liquid consumption:                         Time of last solid consumption:                         Date of last liquid consumption: 22                        Date of last solid food consumption: 22    BMI:   Wt Readings from Last 3 Encounters:   22 172 lb (78 kg)   22 174 lb (78.9 kg)   21 179 lb 1.6 oz (81.2 kg)     Body mass index is 32.5 kg/m².     CBC:   Lab Results   Component Value Date    WBC 10.1 2022    RBC 5.21 2022    HGB 10.8 2022    HCT 37.4 2022    MCV 71.8 2022    RDW 16.5 2022     2022       CMP:   Lab Results   Component Value Date     2022    K 4.2 2022     2022    CO2 23 2022    BUN 16 2022 CREATININE 0.9 01/14/2022    GFRAA >60 01/14/2022    LABGLOM >60 01/14/2022    GLUCOSE 119 01/14/2022    PROT 6.8 01/14/2022    CALCIUM 9.2 01/14/2022    BILITOT <0.2 01/14/2022    ALKPHOS 113 01/14/2022    AST 12 01/14/2022    ALT 6 01/14/2022       POC Tests: No results for input(s): POCGLU, POCNA, POCK, POCCL, POCBUN, POCHEMO, POCHCT in the last 72 hours. Coags: No results found for: PROTIME, INR, APTT    HCG (If Applicable): No results found for: PREGTESTUR, PREGSERUM, HCG, HCGQUANT     ABGs: No results found for: PHART, PO2ART, UEU1FKF, ALR2CMM, BEART, H4FWCSWF     Type & Screen (If Applicable):  No results found for: LABABO, LABRH    Drug/Infectious Status (If Applicable):  No results found for: HIV, HEPCAB    COVID-19 Screening (If Applicable):   Lab Results   Component Value Date    COVID19 Not Detected 01/21/2022           Anesthesia Evaluation  Patient summary reviewed and Nursing notes reviewed no history of anesthetic complications (denies self and family hx): Airway: Mallampati: III  TM distance: >3 FB     Mouth opening: > = 3 FB Dental:          Pulmonary: breath sounds clear to auscultation            Patient did not smoke on day of surgery. ROS comment: Former smoker   covid negative  Medical marijuana last used yesterday started 5 years ago since ankle surgery   Cardiovascular:Negative CV ROS            Rhythm: regular  Rate: normal                    Neuro/Psych:   (+) headaches (tylenol prn): tension headaches, psychiatric history: stable with treatmentdepression/anxiety  (fluoxetine last dose today)            GI/Hepatic/Renal:             Endo/Other:    (+) malignancy/cancer (breast cancer diagnosed 2 weeks ago). Abdominal:   (+) obese,           Vascular:           Other Findings:           Anesthesia Plan      general and MAC     ASA 3     (Pt npo 1930 solids and liquids sips with meds)        Anesthetic plan and risks discussed with patient and spouse. Use of blood products discussed with patient whom consented to blood products. Plan discussed with attending.                   KATE Brown - CRNA   1/26/2022

## 2022-01-26 NOTE — INTERVAL H&P NOTE
Update History & Physical    The patient's History and Physical of January 14, 2022 was reviewed with the patient and I examined the patient. There was no change. The surgical site was confirmed by the patient and me. Plan: The risks, benefits, expected outcome, and alternative to the recommended procedure have been discussed with the patient. Patient understands and wants to proceed with the procedure.      Electronically signed by Mu Warren MD on 1/26/2022 at 7:58 AM

## 2022-01-26 NOTE — OP NOTE
[facility]  OPERATIVE REPORT    Pat Welsh  1975      DATE OF PROCEDURE: 1/26/2022     SURGEON: Kaveh Cisneros MD, MSc, FACS     ASSISTANT: Marybel Daley MD, PGY-I     PREOPERATIVE DIAGNOSIS:  Stage IIIC TNBC. POSTOPERATIVE DIAGNOSIS: Same    OPERATION: left subclavian mediport insertion, 8F Bard (CT and MRI compatible); fluoroscopic guidance     ANESTHESIA: LMAC     ESTIMATED BLOOD LOSS: 43HA     COMPLICATIONS: None    SPECIMEN:  none    DRAINS:  non    HISTORY:  This is a 51 y. o.female who presented with Stage IIIC TNBC. She is going to undergo neoadjuvant chemotherapy. She presents today for a mediport. DESCRIPTION OF PROCEDURE: The patient was identified and the procedure was confirmed. Patient was brought into the operative suite and after appropriate anesthesia, rolled towel placed under scapulas and then prepped and draped in standard surgical fashion. Ancef 2g IV was given. 0.25% marcaine used for local anesthesia. The left subclavian vein was accessed on the third stick was the 14G needle--it was difficult to get under her clavicle. Wire was easily passed into the IVC and checked with fluoro. A subcutaneous pocket was made over the left chest wall by first anesthetizing with 0.25% marcaine then making a 4cm skin incision with #15 blade scalpel. Bovie electrocautery was used to incise the subcutaneous tissue to creat a pocket for the port. An #11 blade scalpel was used to enlarge the wire insertion site. The tunneling device was used to tunnel the catheter from the insertion site to the pocket. The dilator and sheath were placed over the wire under fluoro. The dilator was removed and the catheter placed under fluoro. Once in the SVC, the catheter was cut to length and the catheter attached to the locking device and port. The port was flushed with heparin and had good return of blood and then flushed. The pocket irrigated with normal saline.   The port was attached to Sacha's fascia with 3-0 Prolene x 2. The skin was then closed with 3-0 Vicryl in a deep dermal fashion and skin closed with 4-0 monocryl in a running subcuticular fashion. The insertion site was closed with 4-0 monocryl. The skin was cleaned with wet and dry sponge. The wounds were dressed with Dermabond. The sponge, instrument and needle counts were correct at the end of the case. The patient tolerated the procedure well. She will now go to recovery room and get a chest x-ray and be discharge home later today. Please note the port kept twisting. The pocket was enlarged and prolene x 2 placed around the connector piece. The port was checked with fluoro multiple times and flushed easily as well. Her  was updated after the procedure. CXR pending. Radha Esquivel MD, MSc, FACS  1/26/2022  10:30 AM            Needle, sponge, and instrument counts were reported as correct x2. The patient tolerated the procedure and was transferred to the recovery area in satisfactory condition.     Radha Esquivel MD, MSc, FACS  1/26/2022  10:28 AM

## 2022-01-31 NOTE — TELEPHONE ENCOUNTER
MA received a call from patient in regards to her imaging work up. Patient states she saw her results on MyChart and was questions an interpretation of the bone scan impression. 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 MA stated that when she gets a chance or this afternoon she will most likely call to go over the results. Pt verbalized understanding.        Pt can be reached at [phone]        Electronically signed by Tho Brito MA on 1/31/22 at 8:41 AM EST

## 2022-01-31 NOTE — TELEPHONE ENCOUNTER
I called Della and went over the CT scans and bone scan. I ordered gabapentin and referred to palliative. She starts chemo on Friday.

## 2022-02-03 NOTE — TELEPHONE ENCOUNTER
Called to Dana Alva regarding referral for Palliative care form Dr. Danya Brooks. Dana Alva explains she is going to start treatment tomorrow at the [facility] and will call if she needs our service. Contact number provided.

## 2022-02-11 NOTE — TELEPHONE ENCOUNTER
I called Della and went over her genetic results which were negative. I went over that the test least 20-40 genes to look for anything that could be causing breast cancer. I also explained to her that she also had no VUS signals so she does not have a gene that signal that we do not know much about that could eventually cause some problems. I explained to her that her daughter still need to start their breast cancer screening 10 years before she was diagnosed so they can either start at age 40 or even start as early as age 28 would be acceptable. I apologize that our office did not call her sooner with the results as usually the office calls once they are uploaded into the system. Ashley: Please print off a copy and send to Dr. Gregorio Saldivar office. Please also print off a copy and send to the patient's home.

## 2022-02-11 NOTE — TELEPHONE ENCOUNTER
MA printed copy and mailed to patient address on file. MA also routed it to Oncology office.         Electronically signed by Kalie Kaplan MA on 2/11/22 at 4:04 PM EST

## 2022-02-25 NOTE — PROGRESS NOTES
TeleMedicine Video Visit    Megha Colon, was evaluated through a synchronous (real-time) audio-video encounter. The patient (or guardian if applicable) is aware that this is a billable service. , which includes applicable co-pays. This virtual visit was conducted with the patient's  (and/or legal guardian's) consent. The visit was conducted pursuant to the emergency declaration under the [address] [place] [address] and the [employer] Act. Patient identification was verified, and a caregiver was present when appropriate. The patient was located in a state where the provider was licensed to provide care. Patient identification was verified at the start of the visit, including the patient's telephone number and physical location. I discussed with the patient the nature of our telehealth visits, that:     1. Due to the nature of an audio- video modality, the only components of a physical exam that could be done are the elements supported by direct observation. 2. I would evaluate the patient and recommend diagnostics and treatments based on my assessment. 3. If it was felt that the patient should be evaluated in clinic or an emergency room setting, then they would be directed there. 4. Our sessions are not being recorded and that personal health information is protected. 5. Our team would provide follow up care in person if/when the patient needs it.            Patient's location: other address in [facility] [address] in [place] [address]  location other address in [facility] other people involved in call nobody else involved in the call      On this date 2/25/2022 I have spent 20                                              OFFICE PROGRESS NOTE      SUBJECTIVE:        Patient ID:   Megha Colon is a 49 y.o. female who presents for   Chief Complaint   Patient presents with    URI     C/o productive cough with intermittent low grade fever and  chest congestion x 3 days. Reports she could not have her chemo for her breast cancer due to illness. HPI:   Patient: The video call today complaining of cough and congestion for the last couple of days  No shortness of breath  No fever  Patient has been going to the chemotherapy for breast cancer  Did take some over-the-counter medication without help        Prior to Visit Medications    Medication Sig Taking? Authorizing Provider   gabapentin (NEURONTIN) 100 MG capsule Take 1 capsule by mouth 3 times daily for 60 days.  Intended supply: 90 days Yes Ermias Hernandez MD   ibuprofen (ADVIL;MOTRIN) 600 MG tablet Take 600 mg by mouth every 6 hours as needed for Pain Yes Historical Provider, MD   FLUoxetine (PROZAC) 20 MG capsule Take 20 mg by mouth daily  Yes Historical Provider, MD   Cholecalciferol 50 MCG (2000) CHEW Take 1 capsule by mouth daily  Yes Historical Provider, MD   ondansetron (ZOFRAN) 8 MG tablet TAKE 1 TABLET BY MOUTH EVERY 8 HOURS AS NEEDED FOR NAUSEA OR VOMITING  Historical Provider, MD      Social History     Socioeconomic History    Marital status:      Spouse name: Not on file    Number of children: Not on file    Years of education: Not on file    Highest education level: Not on file   Occupational History    Not on file   Tobacco Use    Smoking status: Former Smoker     Packs/day: 0.50     Years: 15.00     Pack years: 7.50     Types: Cigarettes     Start date:      Quit date: 2018     Years since quittin.1    Smokeless tobacco: Never Used    Tobacco comment: Con't no smoking    Vaping Use    Vaping Use: Never used   Substance and Sexual Activity    Alcohol use: Yes     Comment: Very rarely has a drink    Drug use: Yes     Types: Marijuana Dolph Kevin)     Comment: Medical     Sexual activity: Yes     Partners: Male   Other Topics Concern    Not on file   Social History Narrative    Not on file     Social Determinants of Health     Financial Resource Strain: Low Risk     Difficulty of Paying Living Expenses: Not hard at all   Food Insecurity: No Food Insecurity    Worried About Running Out of Food in the Last Year: Never true    Rupa of Food in the Last Year: Never true   Transportation Needs: No Transportation Needs    Lack of Transportation (Medical): No    Lack of Transportation (Non-Medical): No   Physical Activity:     Days of Exercise per Week: Not on file    Minutes of Exercise per Session: Not on file   Stress:     Feeling of Stress : Not on file   Social Connections:     Frequency of Communication with Friends and Family: Not on file    Frequency of Social Gatherings with Friends and Family: Not on file    Attends Jain Services: Not on file    Active Member of [address] [place] [address] [employer] or Organizations: Not on file    Attends Club or Organization Meetings: Not on file    Marital Status: Not on file   Intimate Partner Violence:     Fear of Current or Ex-Partner: Not on file    Emotionally Abused: Not on file    Physically Abused: Not on file    Sexually Abused: Not on file   Housing Stability:     Unable to Pay for Housing in the Last Year: Not on file    Number of [place] in the Last Year: Not on file    Unstable Housing in the Last Year: Not on file       I have reviewed Rand's allergies, medications, problem list, medical, social and family history and have updated as needed in the electronic medical record  Review Of Systems:    Review of Systems   HENT: Positive for congestion. Respiratory: Positive for cough.                OBJECTIVE:     VS:  Wt Readings from Last 3 Encounters:   01/26/22 172 lb (78 kg)   01/14/22 174 lb (78.9 kg)   07/14/21 179 lb 1.6 oz (81.2 kg)     Temp Readings from Last 3 Encounters:   01/26/22 97.4 °F (36.3 °C) (Temporal)   01/14/22 98.4 °F (36.9 °C) (Infrared)   07/14/21 97.5 °F (36.4 °C) (Temporal)     BP Readings from Last 3 Encounters:   01/26/22 114/66   01/26/22 121/76   01/14/22 (!) 142/68 Physical Exam  Constitutional:       Appearance: Normal appearance. Neurological:      Mental Status: She is alert. Labs :    Lab Results   Component Value Date    WBC 10.1 01/14/2022    HGB 10.8 (L) 01/14/2022    HCT 37.4 01/14/2022     01/14/2022    HDL 42 09/11/2020    ALT 6 01/14/2022    AST 12 01/14/2022     01/14/2022    K 4.2 01/14/2022     01/14/2022    CREATININE 0.9 01/14/2022    BUN 16 01/14/2022    CO2 23 01/14/2022    TSH 2.050 07/08/2021    GLUF 109 (H) 07/08/2021     Lab Results   Component Value Date    COLORU Yellow 09/11/2020    NITRU Negative 09/11/2020    GLUCOSEU Negative 09/11/2020    KETUA Negative 09/11/2020    UROBILINOGEN 0.2 09/11/2020    BILIRUBINUR Negative 09/11/2020     No results found for: PSA, CEA, , IN4830,       Controlled Substances Monitoring:                                    ASSESSMENT     Patient Active Problem List    Diagnosis Date Noted    Malignant neoplasm of upper-outer quadrant of right breast in female, estrogen receptor negative ([facility] [address] 01/14/2022        Diagnosis:     ICD-10-CM    1. Upper respiratory tract infection, unspecified type  J06.9 azithromycin (ZITHROMAX) 250 MG tablet       PLAN:   Force fluids  Z-Alfred  Phenergan DM 1 teaspoonful 4 times a day  Return to clinic if any problems        Patient Instructions   Z-Alfred  Phenergan DM 1 teaspoonful 4 times a day  Call back if any problems      Return if symptoms worsen or fail to improve. Misael Dowd reviewed my findings and recommendations with Flory Bey. Electronicallysigned by Trisha Bernabe MD on 2/25/22 at 9:06 AM EST     minutes reviewing previous notes, test results and face to face (virtual) with the patient discussing the diagnosis and importance of compliance with the treatment plan as well as documenting on the day of the visit. ,     This visit was completed virtually using Doxy. me

## 2022-03-03 NOTE — TELEPHONE ENCOUNTER
Pt called she had VV on 2.25.22 was given Z dyllan for URI and she stated she was to call back if she needed anything. Pt asking for another Z dyllan she feels much better. Just wants to make sure its gone.   Please advise     Last seen 2/25/2022  Next appt Visit date not found

## 2022-03-04 NOTE — PROGRESS NOTES
Chief Complaint   Patient presents with    Chest Congestion     Was seen on 02/25/2022 for URI was z-dyllan and feeling better would like another RX for zpak to be safe undergoing chemo      Discuss Medications     Dr. Zafar Ahr would like her to start on Iron 325mg would like us to order     [facility] Maintenance     PAP with Dr. Ez Hollingsworth 11/2021, Flu , Pneumonia vaccine declined       HPI:  Patient is here for follow-up    I saw her in last July    Since than she is diagnosed with Ca breast- rt, with LN in [place]    Pt is started Chemo ( every 2 weeks x 8 weeks - followed by weekly Taxol x 12 weeks). Later on, tentative plan for Radiation and Surgery     Pt has h/o HIRA- ( was on iron)     Allergy and Medications are reviewed and updated. Past Medical History, Surgical History, and Family History has been reviewed and updated. Review of Systems:  Review of Systems   Constitutional: Positive for fatigue. Negative for chills and fever. HENT: Negative for congestion, sinus pain and sore throat. Eyes: Negative for pain and discharge. Respiratory: Negative for shortness of breath (No new SOb). Cardiovascular: Negative for chest pain. Gastrointestinal: Negative for abdominal pain, blood in stool and nausea. Genitourinary: Negative for flank pain and frequency. Musculoskeletal: Negative for neck pain. Hematological: Does not bruise/bleed easily. Psychiatric/Behavioral: Negative for suicidal ideas. Vitals:    03/04/22 1450   BP: 122/80   Position: Sitting   Pulse: 94   Temp: 97.5 °F (36.4 °C)   TempSrc: Temporal   SpO2: 98%   Weight: 170 lb (77.1 kg)   Height: 5' 1\" (1.549 m)       Physical Exam  Vitals reviewed. Constitutional:       Appearance: She is well-developed. HENT:      Head: Normocephalic and atraumatic. Mouth/Throat:      Pharynx: No oropharyngeal exudate. Eyes:      Conjunctiva/sclera: Conjunctivae normal.      Pupils: Pupils are equal, round, and reactive to light. Neck:      Vascular: No JVD. Cardiovascular:      Rate and Rhythm: Normal rate and regular rhythm. Pulmonary:      Effort: Pulmonary effort is normal.      Breath sounds: Normal breath sounds. No rales. Abdominal:      General: Bowel sounds are normal.      Palpations: Abdomen is soft. Musculoskeletal:         General: Normal range of motion. Lymphadenopathy:      Cervical: No cervical adenopathy. Skin:     General: Skin is warm and dry. Neurological:      Mental Status: She is alert and oriented to person, place, and time. Psychiatric:         Behavior: Behavior normal.          Labs :    Lab Results   Component Value Date    WBC 10.1 01/14/2022    HGB 10.8 (L) 01/14/2022    HCT 37.4 01/14/2022     01/14/2022    HDL 42 09/11/2020    ALT 6 01/14/2022    AST 12 01/14/2022     01/14/2022    K 4.2 01/14/2022     01/14/2022    CREATININE 0.9 01/14/2022    BUN 16 01/14/2022    CO2 23 01/14/2022    TSH 2.050 07/08/2021    GLUF 109 (H) 07/08/2021     Lab Results   Component Value Date    COLORU Yellow 09/11/2020    NITRU Negative 09/11/2020    GLUCOSEU Negative 09/11/2020    KETUA Negative 09/11/2020    UROBILINOGEN 0.2 09/11/2020    BILIRUBINUR Negative 09/11/2020             ASSESSMENT     Patient Active Problem List    Diagnosis Date Noted    Malignant ne[facility] of upper-outer quadrant of right [facility] in [facility] 01/14[facility]        Diagnosis:     ICD-10-CM    1. Other iron deficiency anemia  D50.8 ferrous sulfate (IRON 325) 325 (65 Fe) MG tablet   2. Malignant neoplasm of upper-outer quadrant of right breast in female, estrogen receptor negative ([facility] [address]  C50.411     Z17.1     Under chemo- Dr Trammell Roles   3. Depression, unspecified depression type  F32. [id number] [facility]        PLAN:      Restart Feosol daily    MV daily    CBC, CMP reviewed  [facility] - note reviewed        Pt is stable on current medical treatment.    Continue current treatment plan    Side effects/Adverse effects/Precautions are reviewed with patient. Low salt, Low Carb diet an low fat diet  Continue medications as advised and take them regularly  Regular exercises as advised    Discussed natural and expected course of this diagnosis and need to alert me if symptoms do not follow expected course, or if any worse. Smoking cessation if applicable, discussed with patient. Patient Instructions   The medication list included in this document is our record of what you are currently taking, including any changes that were made at today's visit.  If you find any differences when compared to your medications at home, or have any questions that were not answered at your visit, please contact the office. Return in about 3 months (around 6/4/2022).

## 2022-04-13 NOTE — PROGRESS NOTES
Marva Romero is a 49 y.o. female who presents today for     Chief Complaint   Patient presents with    Leg Swelling     swelling in lower right leg, broke ankle about 5 years ago,  has been hurting and swelling since being on chemo,  does not know if from chemo  but getting worse as time goes on        PCP: Dr. Makeda Saldivar    Pain and Swelling, Right Lower Extremity:  History as above. Pain and swelling of right lower leg. History remote fracture. History breast cancer; currently in active treatment. She reports that her symptoms seem to be getting worse over time. She wears a brace to work, but swelling and pain is worse at the end of her workday. Denies CP, SOB, palpitations, cough, wheezing, GERD symptoms. [address]:  Patient's past medical, surgical, social and/or family history reviewed, updated in chart, and are non-contributory (unless otherwise stated). Medications and allergies also reviewed and updated in chart. Review of Systems  Review of Systems   HENT: Negative for congestion, ear pain and sore throat. Respiratory: Negative for cough, shortness of breath and wheezing. Cardiovascular: Positive for leg swelling. Negative for chest pain and palpitations. Gastrointestinal: Negative for abdominal pain, blood in stool, constipation, diarrhea, nausea and vomiting. Genitourinary: Negative for dysuria, frequency, hematuria and urgency. Musculoskeletal: Negative for back pain, myalgias and neck pain. Skin: Negative for rash. Neurological: Negative for dizziness, weakness and headaches. Psychiatric/Behavioral: The patient is not nervous/anxious.         Physical Exam:    VS:  /80   Pulse 82   Temp 97.4 °F (36.3 °C) (Infrared)   Resp 16   Ht 5' 1\" (1.549 m)   Wt 173 lb (78.5 kg)   SpO2 97%   BMI 32.69 kg/m²     LAST WEIGHT:  Wt Readings from Last 3 Encounters:   04/13/22 173 lb (78.5 kg)   03/04/22 170 lb (77.1 kg)   01/26/22 172 lb (78 kg)       BMI Readings from Last 3 Encounters:   04/13/22 32.69 kg/m²   03/04/22 32.12 kg/m²   01/26/22 32.50 kg/m²       Physical Exam  Constitutional:       General: She is not in acute distress. Appearance: She is well-developed. She is not diaphoretic. HENT:      Head: Normocephalic and atraumatic. Right Ear: External ear normal.      Left Ear: External ear normal.      Mouth/Throat:      Pharynx: No oropharyngeal exudate. Eyes:      General: No scleral icterus. Right eye: No discharge. Conjunctiva/sclera: Conjunctivae normal.      Pupils: Pupils are equal, round, and reactive to light. Neck:      Thyroid: No thyromegaly. Cardiovascular:      Rate and Rhythm: Normal rate and regular rhythm. Heart sounds: Normal heart sounds. No murmur heard. Pulmonary:      Effort: Pulmonary effort is normal. No respiratory distress. Breath sounds: No stridor. No wheezing or rales. Chest:      Chest wall: No tenderness. Abdominal:      General: Bowel sounds are normal. There is no distension. Palpations: Abdomen is soft. There is no mass. Tenderness: There is no abdominal tenderness. There is no guarding. Musculoskeletal:         General: No tenderness. Normal range of motion. Cervical back: Normal range of motion and neck supple. Right lower leg: Swelling present. Edema present. Comments: Positive Homans' sign on the right   Lymphadenopathy:      Cervical: No cervical adenopathy. Skin:     General: Skin is warm and dry. Coloration: Skin is not pale. Findings: No erythema or rash. Neurological:      Mental Status: She is alert and oriented to person, place, and time. Psychiatric:         Behavior: Behavior normal.         Thought Content:  Thought content normal.         Labs:  Lab Results   Component Value Date     02/11/2022    K 3.9 02/11/2022     02/11/2022    CO2 26 02/11/2022    BUN 14 02/11/2022    CREATININE 1.04 02/11/2022    PROT 6.8 01/14/2022 LABALBU 3.7 02/11/2022    CALCIUM 8.6 02/11/2022    GFRAA >60 01/14/2022    LABGLOM 63 02/11/2022    LABGLOM >60 01/14/2022    GLUCOSE 174 02/11/2022    AST 20 02/11/2022    ALT 8 02/11/2022    ALKPHOS 147 02/11/2022    BILITOT 0.3 02/11/2022    TSH 2.050 07/08/2021    HDL 42 09/11/2020    LDLCALC 90 09/11/2020        No results found for: CHOL  No results found for: TRIG  Lab Results   Component Value Date    HDL 42 09/11/2020     Lab Results   Component Value Date    LDLCALC 90 09/11/2020       No results found for: LABA1C  Lab Results   Component Value Date    GLUF 109 (H) 07/08/2021    LDLCALC 90 09/11/2020    CREATININE 1.04 02/11/2022           Assessment / Plan:      Gabriele Sifuentes was seen today for leg swelling. Diagnoses and all orders for this visit:    Pain and swelling of right lower leg: Differential diagnosis does suggest DVT as well as residual edema from old fracture  -     US DUP LOWER EXTREMITY RIGHT OPHELIA; Future (stat)  -     Trial furosemide (LASIX) 20 MG tablet; Take 1 tablet daily x 5 days, then take 1 tablet daily as needed for swelling      Follow Up:  Return for Follow-up with PCP. or sooner if necessary. Call or go to ED immediately if symptoms worsen or persist.    Educational materials and/or home exercises printed for patient's review and were included in patient instructions on his/her AfterVisit Summary and given to patient at the end of visit. Counseled regarding above diagnosis,including possible risks and complications,  especially if left uncontrolled. Counseled regarding the possible side effects, risks, benefits and alternatives to treatment; patient and/or guardian verbalizes understanding, agrees, feels comfortable with and wishes to proceed with above treatment plan. Advised patient tocall with any new medication issues, and read all Rx info from pharmacy to assureaware of all possible risks and side effects of medication before taking.     Reviewed age and gender appropriate health screening exams and vaccinations. Advisedpatient regarding importance of keeping up with recommended health maintenance andto schedule as soon as possible if overdue, as this is important in assessing forundiagnosed pathology, especially cancer, as well as protecting against potentially harmful/life threatening disease. Patient and/or guardian verbalizes understandingand agrees with above counseling, assessment and plan. All questions answered.     Geronimo Lund MD on 4/13/22

## 2022-04-13 NOTE — LETTER
[address] [place] [address] [place] [address]  Phone: [phone]  Fax: [phone]    Donis Alfred MD        April 13, 2022     Patient: Valeriy Delacruz   YOB: 1975   Date of Visit: 4/13/2022       To Whom It May Concern: It is my medical opinion that Valeriy Delacruz may return to work on 4/13/22 with the following restrictions: No sitting or standing for longer than 90 minutes without change of position . If you have any questions or concerns, please don't hesitate to call.     Sincerely,        Donis Alfred MD

## 2022-04-13 NOTE — PATIENT INSTRUCTIONS
Patient Education        Deep Vein Thrombosis: Care Instructions  Overview     A deep vein thrombosis (DVT) is a blood clot in certain veins, usually in the legs, pelvis, or arms. Blood clots in these veins need to be treated because they can get bigger, break loose, and travel through the bloodstream to thelungs. A blood clot in a lung can be life-threatening. The doctor may have given you a blood thinner (anticoagulant). A blood thinner can stop the blood clot from growing larger and prevent new clots from forming. You will need to take a blood thinner for at least 3 months. The doctor has checked you carefully, but problems can develop later. If you notice any problems or new symptoms, get medical treatment right away. Follow-up care is a key part of your treatment and safety. Be sure to make and go to all appointments, and call your doctor if you are having problems. It's also a good idea to know your test results and keep alist of the medicines you take. How can you care for yourself at home?  Take your medicines exactly as prescribed. Call your doctor if you think you are having a problem with your medicine.  If you are taking a blood thinner, be sure you get instructions about how to take your medicine safely. Blood thinners can cause serious bleeding problems.  Try to walk several times a day.  Wear compression stockings if your doctor recommends them. These stockings are tighter at the feet than on the legs. They may reduce pain and swelling in your legs. But there are different types of stockings, and they need to fit right. So your doctor will recommend what you need.  When you sit, use a pillow to raise the arm or leg that has the blood clot. Try to keep it above the level of your heart. When should you call for help? Call 911 anytime you think you may need emergency care.  For example, call if:     You passed out (lost consciousness).      You have symptoms of a blood clot in your lung (called a pulmonary embolism). These include:  ? Sudden chest pain. ? Trouble breathing. ? Coughing up blood. Call your doctor now or seek immediate medical care if:     You have new or worse trouble breathing.      You are dizzy or lightheaded, or you feel like you may faint.      You have symptoms of a blood clot in your arm or leg. These may include:  ? Pain in the arm, calf, back of the knee, thigh, or groin. ? Redness and swelling in the arm, leg, or groin. Watch closely for changes in your health, and be sure to contact your doctor if:     You do not get better as expected. Where can you learn more? Go to https://QualiLife.m2M Strategies. org and sign in to your RadioRx account. Enter U292 in the "[employer] box to learn more about \"Deep Vein Thrombosis: Care Instructions. \"     If you do not have an account, please click on the \"Sign Up Now\" link. Current as of: July 6, 2021               Content Version: 13.2  © 2006-2022 [employer]. Care instructions adapted under license by [facility] ([facility]). If you have questions about a medical condition or this instruction, always ask your healthcare professional. [place] [address] any warranty or liability for your use of this information. Patient Education        Learning About Deep Vein Thrombosis  What is a deep vein thrombosis (DVT)? A deep vein thrombosis (DVT) is a blood clot (thrombus) in a deep vein, usually in the legs. A DVT can be dangerous because it can break loose and travel through the bloodstream to the lungs. There it can block blood flow in thelungs (pulmonary embolism). This can be life-threatening. What are the symptoms? DVT often doesn't cause symptoms. Or it may cause only minor ones. Whensymptoms happen, they include:   Swelling in the affected area of the leg or arm.  Redness and warmth in the affected area.  Pain or tenderness.  You may have pain only when you touch the affected area or when you stand or walk. Sometimes a pulmonary embolism is the first sign that you have DVT. If your doctor thinks you may have DVT, you will probably have an ultrasoundtest. You may have other tests as well. What causes deep vein thrombosis (DVT)? Causes of a blood clot in a deep vein (DVT) include:   Slowed blood flow. This can happen when you're not active for long periods of time. For example, clots can form if you are paralyzed, are confined to bed, or must sit while on a long flight or car trip.  Abnormal clotting problems that make the blood clot too easily or too quickly. This may be caused by certain health problems, such as cancer or a genetic clotting disorder. Pregnancy, hormonal birth control, and hormone therapy can also make blood more likely to clot.  Surgery or an injury to the blood vessels. Blood is more likely to clot in veins shortly after they are injured. How can you prevent DVT?  Exercise your lower leg muscles to help blood flow in your legs. Point your toes up toward your head so the calves of your legs are stretched, then relax and repeat. This is a good exercise to do when you are sitting for long periods of time.  Get out of bed as soon as you can after an illness or surgery. If you need to stay in bed, do the leg exercise noted above every hour when you are awake.  Use special stockings called compression stockings. These stockings are tight at the feet with a gradually looser fit on the leg. Many doctors recommend that you wear compression stockings during a journey longer than 8 hours.  Take breaks when you are on long trips. Stop the car and walk around. On long airplane flights, walk up and down the aisle hourly, and flex and point your feet every 20 minutes while sitting.  Take blood-thinning medicines after some types of surgery if your doctor recommends it. Blood thinners also may be used if you are likely to develop clots.   How is DVT treated? Treatment for DVT usually involves taking blood thinners. They prevent blood clots by increasing the time it takes a blood clot to form. They also helpprevent existing blood clots from getting larger. Your doctor also may suggest that you prop up or elevate your leg or arm when possible, take several walks a day, and wear compression stockings. Thesemeasures may help reduce the pain and swelling that can happen with DVT. Follow-up care is a key part of your treatment and safety. Be sure to make and go to all appointments, and call your doctor if you are having problems. It's also a good idea to know your test results and keep alist of the medicines you take. Where can you learn more? Go to https://vushaper.SpotMe Fitness. org and sign in to your Take the Interview account. Enter T995 in the Ion Torrent box to learn more about \"Learning About Deep Vein Thrombosis. \"     If you do not have an account, please click on the \"Sign Up Now\" link. Current as of: July 6, 2021               Content Version: 13.2  © 2006-2022 [employer]. Care instructions adapted under license by [facility] ([facility]). If you have questions about a medical condition or this instruction, always ask your healthcare professional. [address] any warranty or liability for your use of this information. Patient Education         What Is Deep Vein Thrombosis (DVT)? (01:43)  Your health professional recommends that you watch this short online healthvideo. Learn what can cause a DVT, why it's so dangerous, and what the symptoms are. Purpose:  Teaches the causes and symptoms of DVT and how it can lead to pulmonaryembolism. Goal:  The user will understand what a DVT is and how it can cause pulmonary embolism. How to watch the video    Scan the QR code   OR Visit the website    https://hwi. se/r/E69iy7jans3ya   Current as of: July 6, 2021               Content Version: 13.2  © 2006-2022 Healthwise[employer] Incorporated. Care instructions adapted under license by [facility] ([facility]). If you have questions about a medical condition or this instruction, always ask your healthcare professional. [address] any warranty or liability for your use of this information. Patient Education        Leg and Ankle Edema: Care Instructions  Your Care Instructions  Swelling in the legs, ankles, and feet is called edema. It is common after you sit or stand for a while. Long plane flights or car rides often cause swelling in the legs and feet. You may also have swelling if you have to stand for long periods of time at your job. Problems with the veins in the legs (varicose veins) and changes in hormones can also cause swelling. Sometimes the swelling in the ankles and feet is caused by a more serious problem, such as heartfailure, infection, blood clots, or liver or kidney disease. Follow-up care is a key part of your treatment and safety. Be sure to make and go to all appointments, and call your doctor if you are having problems. It's also a good idea to know your test results and keep alist of the medicines you take. How can you care for yourself at home?  If your doctor gave you medicine, take it as prescribed. Call your doctor if you think you are having a problem with your medicine.  Whenever you are resting, raise your legs up. Try to keep the swollen area higher than the level of your heart.  Take breaks from standing or sitting in one position. ? Walk around to increase the blood flow in your lower legs. ? Move your feet and ankles often while you stand, or tighten and relax your leg muscles.  Wear support stockings. Put them on in the morning, before swelling gets worse.  Eat a balanced diet. Lose weight if you need to.  Limit the amount of salt (sodium) in your diet. Salt holds fluid in the body and may increase swelling. When should you call for help?    Call 911 anytime you think you may need emergency care. For example, call if:     You have symptoms of a blood clot in your lung (called a pulmonary embolism). These may include:  ? Sudden chest pain. ? Trouble breathing. ? Coughing up blood. Call your doctor now or seek immediate medical care if:     You have signs of a blood clot, such as:  ? Pain in your calf, back of the knee, thigh, or groin. ? Redness and swelling in your leg or groin.      You have symptoms of infection, such as:  ? Increased pain, swelling, warmth, or redness. ? Red streaks or pus. ? A fever. Watch closely for changes in your health, and be sure to contact your doctor if:     Your swelling is getting worse.      You have new or worsening pain in your legs.      You do not get better as expected. Where can you learn more? Go to https://Spawn LabspepicewThirdPresence.FamilySkyline. org and sign in to your Rent Here account. Enter T007 in the [facility] box to learn more about \"Leg and Ankle Edema: Care Instructions. \"     If you do not have an account, please click on the \"Sign Up Now\" link. Current as of: July 1, 2021               Content Version: 13.2  © 2006-2022 Healthwise, Incorporated. Care instructions adapted under license by [facility] ([facility]). If you have questions about a medical condition or this instruction, always ask your healthcare professional. [place] [address] any warranty or liability for your use of this information.

## 2022-05-20 NOTE — PROGRESS NOTES
[facility]  PROGRESS NOTE  ATTENDING NOTE    Chief Complaint   Patient presents with    Breast Cancer     midpoint neoadjuvant chemotherapy. Patiet states she can feel the lump again.  Headache     Patient states has been having frequent increase of headaches for past 6 weeks and they are intense when having them. Patient states worse in morning. S: 48-year-old female with a history of right breast cancer. She is triple negative with axillary involvement. She is about FDC through her chemotherapy. She will be done July 1. She states that the mass in her breast has decreased in size. She states that her range of motion is better in her right upper extremity. She states the lymph node is still there but smaller. She has been having some headaches with this treatment. She states she Occasionally feels the lump in her breast.    BP (!) 142/84 (Site: Left Upper Arm, Position: Sitting, Cuff Size: Medium Adult)   Pulse 72   Temp 98.2 °F (36.8 °C) (Temporal)   Resp 12   Ht 5' 1\" (1.549 m)   Wt 172 lb (78 kg)   SpO2 98%   BMI 32.50 kg/m²   Physical Exam  Constitutional:       Appearance: Normal appearance. HENT:      Head: Normocephalic and atraumatic. Nose: Nose normal.      Mouth/Throat:      Mouth: Mucous membranes are moist.      Pharynx: Oropharynx is clear. Eyes:      Extraocular Movements: Extraocular movements intact. Pupils: Pupils are equal, round, and reactive to light. Cardiovascular:      Rate and Rhythm: Normal rate and regular rhythm. Pulses: Normal pulses. Heart sounds: Normal heart sounds. Pulmonary:      Effort: Pulmonary effort is normal.      Breath sounds: Normal breath sounds. Chest:   Breasts:      Right: Axillary adenopathy present. No swelling, bleeding, inverted nipple, mass, nipple discharge, skin change, tenderness or supraclavicular adenopathy.       Left: No swelling, bleeding, inverted nipple, mass, nipple discharge, skin change, tenderness, axillary adenopathy or supraclavicular adenopathy. Abdominal:      General: There is no distension. Palpations: Abdomen is soft. Tenderness: There is no abdominal tenderness. Musculoskeletal:         General: No tenderness or signs of injury. Cervical back: Normal range of motion and neck supple. Lymphadenopathy:      Upper Body:      Right upper body: Axillary adenopathy present. No supraclavicular adenopathy. Left upper body: No supraclavicular or axillary adenopathy. Skin:     General: Skin is warm and dry. Neurological:      General: No focal deficit present. Mental Status: She is alert and oriented to person, place, and time. Psychiatric:         Mood and Affect: Mood normal.         Behavior: Behavior normal.         Thought Content: Thought content normal.         Judgment: Judgment normal.       ASSESSMENT/PLAN:  Stage IIIC right breast cancer TNBC  -- Patient is bone scan was abnormal prior to starting treatment but did not correlate with her CAT scans. Plan to repeat her bone scan when she is done with treatment. I will follow-up with Dr. Sunny Mueller to review timing of this. -- I have discussed lumpectomy versus mastectomy and lymph node biopsy. I explained to patient that since she is had regression of her breast mass she would be a good candidate for lumpectomy. She will probably have to have this needle localized as I cannot feel today on exam.  I explained to patient that we could move forward with a sentinel lymph node biopsy however at this point in time it is palpable so if she still has clinically palpable nodes on day of surgery she will need a full actually lymph node dissection I can use the tracer in the methylene blue to help guide my dissection.   I was explained to the patient that we need to get the clipped node and at least 2 additional lymph nodes to be a true beneficial sentinel lymph node biopsy after neoadjuvant chemotherapy. I explained to patient that most the time after neoadjuvant therapy especially with the size of the lymph nodes that she had they are likely clumped together and that makes doing a sentinel lymph node biopsy very difficult. I have prepared her for most likely she will have a full axillary lymph node dissection.     Rayray Hyatt MD, MSc, FACS  5/20/2022  4:20 PM

## 2022-06-29 NOTE — TELEPHONE ENCOUNTER
Patient called stating she is seeing the [facility] for Breast Cancer. She is having a lot of pain called [facility] they ordered testing (ultrasound) but nothing to help with the pain. Using Gabapentin, and Ibuprofen, Tylenol, hot compress, cold compress nothing helping. Seeing Dr. Jennifer Alexandre on Friday didn't know if you could recommend something to take she doesn't know where to turn to.  I recommended ER if she continues to get worse

## 2022-07-07 NOTE — TELEPHONE ENCOUNTER
RN received call from patient in regards to wanting to move forward with scheduling surgery. RN asked patient if she had repeat imaging done. Patient states she had C A/P/C and NM Bone scan done 7/6/2022 @ [facility]. RN requested the reports to be faxed to office so that they can be scanned in patient chart. RN scanned imaging into patient chart. RN routing message to  for advisement on moving forward with patient surgery and to review recent imaging.        Patient can be reached at [phone]      Electronically signed by Daniel Langford RN on 7/7/22 at 9:32 AM EDT

## 2022-07-11 NOTE — ED NOTES
Department of Emergency Medicine  FIRST PROVIDER TRIAGE NOTE             In[facility]           7/11/22  7:25 PM EDT    Date of Encounter: 7/11/22   MRN: [id number]      HPI: Carlos Manuel Schuster is a 49 y.o. female who presents to the ED for No chief complaint on file. hx breast cancer with metastasis to bone and liver. Now having abd pain  Sob difficulty taking deep breath     ROS: Negative for Suicidal ideation or Homicidal Ideation. PE: Gen Appearance/Constitutional: alert  CV: regular rate  Pulm: CTA bilat     Initial Plan of Care: All treatment areas with department are currently occupied. Plan to order/Initiate the following while awaiting opening in ED: labs, EKG and imaging studies.   Initiate Treatment-Testing, Proceed toTreatment Area When Bed Available for ED Attending/MLP to Continue Care    Electronically signed by DB Walker   DD: 7/11/22       DB Walker  07/11/22 [id number]

## 2022-07-12 PROBLEM — C79.51 BREAST CANCER METASTASIZED TO BONE, UNSPECIFIED LATERALITY (HCC): Status: ACTIVE | Noted: 2022-01-01

## 2022-07-12 PROBLEM — N17.9 AKI (ACUTE KIDNEY INJURY) (HCC): Status: ACTIVE | Noted: 2022-01-01

## 2022-07-12 PROBLEM — K83.09 CHOLANGITIS: Status: ACTIVE | Noted: 2022-01-01

## 2022-07-12 PROBLEM — C78.7 BREAST CANCER METASTASIZED TO LIVER, UNSPECIFIED LATERALITY (HCC): Status: ACTIVE | Noted: 2022-01-01

## 2022-07-12 PROBLEM — C50.919 BREAST CANCER METASTASIZED TO LIVER, UNSPECIFIED LATERALITY (HCC): Status: ACTIVE | Noted: 2022-01-01

## 2022-07-12 PROBLEM — C50.919 BREAST CANCER METASTASIZED TO BONE, UNSPECIFIED LATERALITY (HCC): Status: ACTIVE | Noted: 2022-01-01

## 2022-07-12 NOTE — ED NOTES
Dr. Ayaz Spaulding informed of Lactic Acid of 12. 8. pt had received 1 L of fluids already. Pt okay to receive IV contrast dye with GFR of 44 per Dr. Ayaz Spaulding.       Javid Spearing  07/11/22 [id number]

## 2022-07-12 NOTE — PROGRESS NOTES
Patient's , Geovanni Kelley, is wanting to speak with the oncologist about concerns with his wife. Office called and message left with . Per the [facility], she will pass along the message and contact details and give Geovanni Kelley a call.

## 2022-07-12 NOTE — ACP (ADVANCE CARE PLANNING)
Advance Care Planning   Healthcare Decision Maker:    Primary Decision Maker: Pop Dad - [phone]    Electronically signed by Dipak Zavala RN-BC on 7/12/2022 at 10:38 AM

## 2022-07-12 NOTE — ED NOTES
Nurse to nurse given to Adele Mullen RN pt to go to ICU-10, US called out for stat US of gallbladder.      Negra Martino  07/12/22 [id number]

## 2022-07-12 NOTE — ED PROVIDER NOTES
Patient is a 50 y/o female who presents to the ED with abdominal pain, nausea, vomiting and shortness of breath. Patient's  states that she has metastatic cancer to the bones and liver. For the past two weeks she has complained of abdominal pain. Her pain is worse in the right upper abdomen. For the past four days, she has been nauseated and has vomited. She has been unable to hold anything down. She also reports shortness of breath. There has been no fever. Patient was prescribed morphine, however, the pharmacy could not fill it. Review of Systems   Constitutional:  Negative for chills and fever. HENT:  Negative for ear pain, sinus pressure and sore throat. Eyes:  Negative for pain, discharge and redness. Respiratory:  Positive for shortness of breath. Negative for cough and wheezing. Cardiovascular:  Negative for chest pain. Gastrointestinal:  Positive for abdominal pain, nausea and vomiting. Negative for abdominal distention and diarrhea. Genitourinary:  Negative for dysuria and frequency. Musculoskeletal:  Negative for arthralgias and back pain. Skin:  Negative for rash and wound. Neurological:  Negative for weakness and headaches. Hematological:  Negative for adenopathy. All other systems reviewed and are negative. Physical Exam  Vitals and nursing note reviewed. Constitutional:       General: She is not in acute distress. HENT:      Head: Normocephalic and atraumatic. Mouth/Throat:      Mouth: Mucous membranes are moist.   Eyes:      Pupils: Pupils are equal, round, and reactive to light. Cardiovascular:      Rate and Rhythm: Regular rhythm. Tachycardia present. Heart sounds: No murmur heard. Pulmonary:      Effort: Pulmonary effort is normal. No respiratory distress. Breath sounds: Normal breath sounds. No stridor. No wheezing, rhonchi or rales. Abdominal:      General: Bowel sounds are normal. There is distension.       Palpations: Abdomen is soft. Tenderness: There is generalized abdominal tenderness. There is no guarding. Negative signs include Hollins's sign and McBurney's sign. Skin:     General: Skin is warm and dry. Findings: No rash. Neurological:      Mental Status: She is alert and oriented to person, place, and time. Procedures     MDM            Radiology Procedure Waiver   Name: Edgar Martinez  : 1975  MRN: [id number]    Date:  22    Time: 10:12 PM EDT    Benefits of immediately proceeding with radiology exam(s) without pre-testing outweigh the risks or are not indicated as specified below and therefore the following is/are being waived:    [x] Benefits of immediate radiology exam(s) outweigh any risk. OR    Pre-exam testing is not indicated for the following reason(s):  [] Pregnancy test   [] Patients LMP on-time and regular.   [] Patient had Tubal Ligation or has other Contraception Device. [] Patient  is Menopausal or Premenarcheal.    [] Patient had Full or Partial Hysterectomy. [] Protocol for CT contrast allegry   [] Patient has tolerated well previously   [] Patient does not have a true allergy    [] MRI Questionnaire     [] BUN/Creatinine   [] Patient age w/no hx of renal dysfunction. [] Patient on Dialysis. [] Recent Normal Labs. Electronically signed by Nisha Mitchell DO on 22 at 10:12 PM EDT               --------------------------------------------- PAST HISTORY ---------------------------------------------  Past Medical History:  has a past medical history of Anxiety, Cancer ([address] and Headache. Past Surgical History:  has a past surgical history that includes Dilation and curettage of uterus; Ankle surgery (Right); US BREAST BIOPSY W LOC DEVICE 1ST LESION RIGHT (Right, 2022); [facility] US GUIDED PLACE LOC DEVICE PERC 1ST LESION (2022); Tubal ligation (2021);  Endometrial ablation (2021); and Port Surgery (Left, 135 132 - 146 mmol/L    Potassium reflex Magnesium 4.8 3.5 - 5.0 mmol/L    Chloride 92 (L) 98 - 107 mmol/L    CO2 12 (L) 22 - 29 mmol/L    Anion Gap 31 (H) 7 - 16 mmol/L    Glucose 111 (H) 74 - 99 mg/dL    BUN 22 (H) 6 - 20 mg/dL    CREATININE 1.3 (H) 0.5 - 1.0 mg/dL    GFR Non-African American 44 >=60 mL/min/1.73    GFR African American 53     Calcium 10.6 (H) 8.6 - 10.2 mg/dL    Total Protein 5.8 (L) 6.4 - 8.3 g/dL    Albumin 3.3 (L) 3.5 - 5.2 g/dL    Total Bilirubin 5.0 (H) 0.0 - 1.2 mg/dL    Alkaline Phosphatase 581 (H) 35 - 104 U/L     (H) 0 - 32 U/L     (H) 0 - 31 U/L   Lipase   Result Value Ref Range    Lipase 11 (L) 13 - 60 U/L   Lactic Acid   Result Value Ref Range    Lactic Acid 12.8 (HH) 0.5 - 2.2 mmol/L   Urinalysis with Microscopic   Result Value Ref Range    Color, UA Yellow Straw/Yellow    Clarity, UA SLCLOUDY Clear    Glucose, Ur Negative Negative mg/dL    Bilirubin Urine MODERATE (A) Negative    Ketones, Urine 15 (A) Negative mg/dL    Specific Gravity, UA 1.020 1.005 - 1.030    Blood, Urine Negative Negative    pH, UA 5.0 5.0 - 9.0    Protein, UA TRACE Negative mg/dL    Urobilinogen, Urine 1.0 <2.0 E.U./dL    Nitrite, Urine Negative Negative    Leukocyte Esterase, Urine TRACE (A) Negative   Troponin   Result Value Ref Range    Troponin, High Sensitivity 34 (H) 0 - 9 ng/L   Brain Natriuretic Peptide   Result Value Ref Range    Pro-BNP 1,137 (H) 0 - 125 pg/mL   Troponin   Result Value Ref Range    Troponin, High Sensitivity 34 (H) 0 - 9 ng/L   Lactic Acid   Result Value Ref Range    Lactic Acid 8.5 (HH) 0.5 - 2.2 mmol/L   EKG 12 Lead   Result Value Ref Range    Ventricular Rate 125 BPM    Atrial Rate 125 BPM    P-R Interval 124 ms    QRS Duration 74 ms    Q-T Interval 320 ms    QTc Calculation (Bazett) 461 ms    P Axis 32 degrees    R Axis 33 degrees    T Axis 24 degrees       RADIOLOGY:  CTA CHEST W CONTRAST   Final Result   No evidence of pulmonary embolism or acute pulmonary abnormality. Sclerotic changes involving the visualized osseous structures consistent with   metastatic disease. This was present on prior study 01/28/2022. Hepatomegaly. Patient has innumerable known liver metastasis that are better   appreciated on CT scan of the abdomen and pelvis. Right breast mass and significant right axillary adenopathy. CT ABDOMEN PELVIS W IV CONTRAST Additional Contrast? None   Final Result   Widely metastatic disease involving the liver and skeleton. Hepatomegaly. Right axillary lymphadenopathy not significantly changed. XR CHEST PORTABLE   Final Result   No acute process. EKG:  This EKG is signed and interpreted by me. Rate: 125  Rhythm: Sinus  Interpretation: non-specific EKG and sinus tachycardia  Comparison: no previous EKG available      ------------------------- NURSING NOTES AND VITALS REVIEWED ---------------------------  Date / Time Roomed:  7/11/2022  7:52 PM  ED Bed Assignment:  04/04    The nursing notes within the ED encounter and vital signs as below have been reviewed. Patient Vitals for the past 24 hrs:   BP Temp Temp src Pulse Resp SpO2   07/12/22 0116 104/69 -- -- (!) 128 (!) 35 95 %   07/12/22 0010 126/86 -- -- (!) 124 (!) 33 96 %   07/11/22 2204 (!) 127/90 -- -- (!) 121 30 95 %   07/11/22 2059 113/81 -- -- (!) 116 (!) 32 95 %   07/11/22 1927 109/72 -- -- (!) 129 16 --   07/11/22 1851 -- 97.4 °F (36.3 °C) Oral (!) 120 18 98 %       Oxygen Saturation Interpretation: Normal    ------------------------------------------ PROGRESS NOTES ------------------------------------------  Re-evaluation(s):  Time: 8236. Patients symptoms are improving  Repeat physical examination is not changed    Counseling:  I have spoken with the patient and discussed todays results, in addition to providing specific details for the plan of care and counseling regarding the diagnosis and prognosis.   Their questions are answered at this time and they are agreeable with the plan of admission.    --------------------------------- ADDITIONAL PROVIDER NOTES ---------------------------------  Consultations:  Time: 0200. Spoke with Dr. Jack Luo. Discussed case. They will admit the patient. Spoke with Dr. Destiny Poe (Surgery). Discussed the case. They will provide consultation. Spoke with Dr. Rik Morris (Critical Care). Discussed the case. They will provide consultation. This patient's ED course included: a personal history and physicial examination, re-evaluation prior to disposition, multiple bedside re-evaluations, IV medications, cardiac monitoring and continuous pulse oximetry    This patient has remained hemodynamically stable during their ED course. Diagnosis:  1. Right upper quadrant abdominal pain    2. Intractable nausea and vomiting    3. Elevated lactic acid level        Disposition:  Patient's disposition: Admit to ICU  Patient's condition is stable. Please note that the withdrawal or failure to initiate urgent interventions for this patient would likely result in a life threatening deterioration or permanent disability. Accordingly this patient received 30 minutes of critical care time, excluding separately billable procedures.        [address] [facility],   08/18/22 1520

## 2022-07-12 NOTE — PROGRESS NOTES
Dr. Jamia Coker,     Your patient is on a medication that requires a renal dose adjustment. Renal Function Assessment:    Date Body Weight IBW  Adjusted BW SCr  CrCl Dialysis status   7/12/2022 182 lb 14.4 oz (83 kg)  Ideal body weight: 47.8 kg (105 lb 6.1 oz)  Adjusted ideal body weight: 61.9 kg (136 lb 6.2 oz) Serum creatinine: 1.2 mg/dL (H) 07/12/22 0616  Estimated creatinine clearance: 56 mL/min (A) N/a       Pharmacy has renally dose-adjusted the following medication(s):    Date Original Order Renally Adjusted Order   7/12/2022 Cefepime 1000mg q12h Cefepime 2000mg q12h       These changes were made per protocol according to the [facility] Renal Function-Based Dose Adjustments Policy    *Please note this dose may need readjusted if your patient's renal function significantly improves. Please contact pharmacy with any questions regarding these changes.     Berry Zuñiga [facility]  7/12/2022  10:40 AM

## 2022-07-12 NOTE — ED NOTES
RN was informed after taking pt to ICU-10 that US was not coming out for US of gall bladder at this time. Dr. Asia Mason informed and stated that it is fine, she can get the [address] first thing in morning. Pt's nurse Jessica Link RN in ICU, updated.       Rashmimarvin Severin  07/12/22 9188

## 2022-07-12 NOTE — CONSULTS
CRITICAL CARE PROGRESS NOTE    The patient's case was discussed in multidisciplinary rounds including critical care specialist, nursing, RT and pharmacy. Her evaluation is as follows:     49year old person with PMH as described below admitted to ICU for management of     1) Sepsis due to possible intraabdominal infection/UTI vs metatatic disease (breast cancer) causing lactic acidosis  --Crystalloid fluid administration: D5W + sodium bicarbonate  --Antibiotics: Cefepime  --Lactic acid follow up  --Cultures obtained  --Vasopressor support with norepinephrine drip if MAP <65  --For liver biopsy today     2) Acute kidney injury secondary to sepsis/hypovolemia/prerenal  --Avoid nephrotoxins and dose medications according GFR  --Urinary electolytes ordered       DVT prophylaxis with SCDs  Nutrition: PO  Vascular catheters: Mediport present  Urinary catheter - External cath  GI prophylaxis with PPI  Goals of care: Full code    /70   Pulse (!) 118   Temp 97.7 °F (36.5 °C)   Resp 22   Ht 5' 1\" (1.549 m)   Wt 182 lb 14.4 oz (83 kg)   SpO2 94%   BMI 34.56 kg/m²   General: Awake, oriented to place, time and person, appears acutely ill  HEENT: No head lesions, PERRL, EOMI, mouth without lesions, no nasal lesions, no cervical adenopathy palpated  Respiratory: Lungs with equal breath sounds bilaterally, no adventitious sounds auscultated, no accessory muscle use  CV: Regular rate, no murmurs, no JVD, no leg edema  Abdomen: Soft, non tender, + bowel sounds, no lesions  Skin: Hydrated, adequate turgor, no rash, capillary refill <2 seconds  Extremities: Muscular strength 4/5 in 4 limbs, moves 4 limbs spontaneously, distal pulses present  Neurology: Awake and alert, follows commands, moves 4 limbs on command and spontaneously, neck is supple, no meningitic signs present.       Recent Labs     07/12/22  0616      K 4.8   CL 98   CO2 14*   BUN 25*   CREATININE 1.2*   GLUCOSE 71*   CALCIUM 9.4       Recent Labs 07/12/22  0616   WBC 14.1*   RBC 4.01   HGB 12.5   HCT 39.5   MCV 98.5   MCH 31.2   MCHC 31.6*   RDW 16.0*      MPV 10.5       No results for input(s): BC in the last 72 hours. No results for input(s): Foreign Bidding in the last 72 hours.     24 HR INTAKE/OUTPUT:      Intake/Output Summary (Last 24 hours) at 7/12/2022 1027  Last data filed at 7/12/2022 0913  Gross per 24 hour   Intake 655.92 ml   Output --   Net 655.92 ml     MEDICATIONS:   promethazine  25 mg IntraMUSCular Once    Vitamin D  2 tablet Oral Daily    gabapentin  300 mg Oral TID    FLUoxetine  20 mg Oral Daily    sodium chloride flush  5-40 mL IntraVENous 2 times per day    enoxaparin  40 mg SubCUTAneous Daily    meropenem  1,000 mg IntraVENous Q8H    morphine  15 mg Oral 2 times per day    pantoprazole  40 mg Oral QAM AC      sodium chloride      dextrose 5% in lactated ringers 100 mL/hr at 07/12/22 0911    dextrose       sodium chloride flush, sodium chloride, polyethylene glycol, ondansetron, HYDROmorphone, glucose, dextrose bolus **OR** dextrose bolus, glucagon (rDNA), dextrose    OBJECTIVE:  Vitals:    07/12/22 0803   BP:    Pulse:    Resp: 28   Temp:    SpO2:            O2 Device: None (Room air)        LABS:  WBC   Date Value Ref Range Status   07/12/2022 14.1 (H) 4.5 - 11.5 E9/L Final   07/11/2022 16.2 (H) 4.5 - 11.5 E9/L Final   04/15/2022 11.6 10^3/mL Final     Hemoglobin   Date Value Ref Range Status   07/12/2022 12.5 11.5 - 15.5 g/dL Final   07/11/2022 14.2 11.5 - 15.5 g/dL Final   04/15/2022 12.1 12.0 - 16.0 g/dL Final     Hematocrit   Date Value Ref Range Status   07/12/2022 39.5 34.0 - 48.0 % Final   07/11/2022 44.7 34.0 - 48.0 % Final   04/15/2022 38.4 36 - 46 % Final     MCV   Date Value Ref Range Status   07/12/2022 98.5 80.0 - 99.9 fL Final   07/11/2022 98.5 80.0 - 99.9 fL Final   04/15/2022 82.1 fL Final     Platelets   Date Value Ref Range Status   07/12/2022 285 130 - 450 E9/L Final   07/11/2022 376 130 - 450 E9/L Final 04/15/2022 218 K/µL Final     Sodium   Date Value Ref Range Status   07/12/2022 136 132 - 146 mmol/L Final   07/11/2022 135 132 - 146 mmol/L Final   04/15/2022 137 mmol/L Final     Potassium   Date Value Ref Range Status   04/15/2022 3.8 mmol/L Final   04/01/2022 4.1 mmol/L Final   02/11/2022 3.9 mmol/L Final     Potassium reflex Magnesium   Date Value Ref Range Status   07/12/2022 4.8 3.5 - 5.0 mmol/L Final   07/11/2022 4.8 3.5 - 5.0 mmol/L Final     Chloride   Date Value Ref Range Status   07/12/2022 98 98 - 107 mmol/L Final   07/11/2022 92 (L) 98 - 107 mmol/L Final   04/15/2022 102 mmol/L Final     CO2   Date Value Ref Range Status   07/12/2022 14 (L) 22 - 29 mmol/L Final   07/11/2022 12 (L) 22 - 29 mmol/L Final   04/15/2022 23 mmol/L Final     BUN   Date Value Ref Range Status   07/12/2022 25 (H) 6 - 20 mg/dL Final   07/11/2022 22 (H) 6 - 20 mg/dL Final   04/15/2022 16 mg/dL Final     CREATININE   Date Value Ref Range Status   07/12/2022 1.2 (H) 0.5 - 1.0 mg/dL Final   07/11/2022 1.3 (H) 0.5 - 1.0 mg/dL Final   04/15/2022 0.77  Final     Glucose   Date Value Ref Range Status   07/12/2022 71 (L) 74 - 99 mg/dL Final   07/11/2022 111 (H) 74 - 99 mg/dL Final   04/15/2022 254 mg/dL Final     Calcium   Date Value Ref Range Status   07/12/2022 9.4 8.6 - 10.2 mg/dL Final   07/11/2022 10.6 (H) 8.6 - 10.2 mg/dL Final   04/15/2022 9.2 mg/dL Final     Total Protein   Date Value Ref Range Status   07/12/2022 5.0 (L) 6.4 - 8.3 g/dL Final   07/12/2022 4.9 (L) 6.4 - 8.3 g/dL Final   07/11/2022 5.8 (L) 6.4 - 8.3 g/dL Final     Albumin   Date Value Ref Range Status   07/12/2022 2.9 (L) 3.5 - 5.2 g/dL Final   07/12/2022 2.9 (L) 3.5 - 5.2 g/dL Final   07/11/2022 3.3 (L) 3.5 - 5.2 g/dL Final     Total Bilirubin   Date Value Ref Range Status   07/12/2022 4.6 (H) 0.0 - 1.2 mg/dL Final   07/12/2022 4.4 (H) 0.0 - 1.2 mg/dL Final   07/11/2022 5.0 (H) 0.0 - 1.2 mg/dL Final     Alkaline Phosphatase   Date Value Ref Range Status 07/12/2022 503 (H) 35 - 104 U/L Final   07/12/2022 468 (H) 35 - 104 U/L Final   07/11/2022 581 (H) 35 - 104 U/L Final     AST   Date Value Ref Range Status   07/12/2022 400 (H) 0 - 31 U/L Final   07/12/2022 321 (H) 0 - 31 U/L Final   07/11/2022 247 (H) 0 - 31 U/L Final     Comment:     Specimen is slightly Hemolyzed. Result may be artificially increased. ALT   Date Value Ref Range Status   07/12/2022 180 (H) 0 - 32 U/L Final   07/12/2022 157 (H) 0 - 32 U/L Final   07/11/2022 135 (H) 0 - 32 U/L Final     GFR Non-   Date Value Ref Range Status   07/12/2022 48 >=60 mL/min/1.73 Final     Comment:     Chronic Kidney Disease: less than 60 ml/min/1.73 sq.m. Kidney Failure: less than 15 ml/min/1.73 sq.m. Results valid for patients 18 years and older. 07/11/2022 44 >=60 mL/min/1.73 Final     Comment:     Chronic Kidney Disease: less than 60 ml/min/1.73 sq.m. Kidney Failure: less than 15 ml/min/1.73 sq.m. Results valid for patients 18 years and older. 01/14/2022 >60 >=60 mL/min/1.73 Final     Comment:     Chronic Kidney Disease: less than 60 ml/min/1.73 sq.m. Kidney Failure: less than 15 ml/min/1.73 sq.m. Results valid for patients 18 years and older. Gfr Calculated   Date Value Ref Range Status   04/15/2022 91  Final   04/01/2022 78  Final   02/11/2022 63  Final     GFR    Date Value Ref Range Status   07/12/2022 58  Final   07/11/2022 53  Final   01/14/2022 >60  Final     No results found for: MG  No results found for: PHOS  No results for input(s): PH, PO2, PCO2, HCO3, BE, O2SAT in the last 72 hours. RADIOLOGY:  US GALLBLADDER RUQ   Final Result   Diffusely abnormal liver parenchyma consistent with known diffuse hepatic   metastatic disease. Perihepatic ascites identified         CTA CHEST W CONTRAST   Final Result   No evidence of pulmonary embolism or acute pulmonary abnormality.       Sclerotic changes involving the visualized osseous structures consistent with   metastatic disease. This was present on prior study 01/28/2022. Hepatomegaly. Patient has innumerable known liver metastasis that are better   appreciated on CT scan of the abdomen and pelvis. Right breast mass and significant right axillary adenopathy. CT ABDOMEN PELVIS W IV CONTRAST Additional Contrast? None   Final Result   Widely metastatic disease involving the liver and skeleton. Hepatomegaly. Right axillary lymphadenopathy not significantly changed. XR CHEST PORTABLE   Final Result   No acute process. PROBLEM LIST:  Principal Problem:    Cholangitis  Resolved Problems:    * No resolved hospital problems. *      ATTESTATION:  ICU Staff Physician note of personal involvement in Care  As the attending physician, I certify that I personally reviewed the patients history and personnally examined the patient to confirm the physical findings described above,  And that I reviewed the relevant imaging studies and available reports. I also discussed the differential diagnosis and all of the proposed management plans with the patient and individuals accompanying the patient to this visit. They had the opportunity to ask questions about the proposed management plans and to have those questions answered. This patient has a high probability of sudden, clinically significant deterioration, which requires the highest level of physician preparedness to intervene urgently. I managed/supervised life or organ supporting interventions that required frequent physician assessment. I devoted my full attention to the direct care of this patient for the amount of time indicated below. Time I spent with the family or surrogate(s) is included only if the patient was incapable of providing the necessary information or participating in medical decisions - Time devoted to teaching and to any procedures I billed separately is not included.      CRITICAL CARE TIME:  30 minutes    Nellie Hurtado MD  Pulmonary and Critical Care Medicine

## 2022-07-12 NOTE — PLAN OF CARE
Problem: Safety - Adult  Goal: Free from fall injury  7/12/2022 0432 by Juli Trujillo RN  Outcome: Progressing  7/12/2022 0431 by Juli Trujillo RN  Outcome: Progressing     Problem: ABCDS Injury Assessment  Goal: Absence of physical injury  7/12/2022 0432 by Juli Trujillo RN  Outcome: Progressing  7/12/2022 0431 by Juli Trujillo RN  Outcome: Progressing

## 2022-07-12 NOTE — CARE COORDINATION
7/12/2022 Cm transition of care; ICU,  at bedside, [address] left chest, just finished her chemo two weeks ago. Pt from home 4 story home plans to return with spouse. No DME, No HHC, SNF, rehab in the past.   will provide a ride at discharge. Cm following.  Electronically signed by Gomez Fitzgerald RN-BC on 7/12/2022 at 11:35 AM

## 2022-07-12 NOTE — PROGRESS NOTES
[address] [place] [address]   Follow up Progress Note (admitted after midnight    Admitting Date and Time: 7/11/2022  7:52 PM  Admit Dx: Cholangitis [K83.09]  Elevated lactic acid level [R79.89]  Right upper quadrant abdominal pain [R10.11]  Intractable nausea and vomiting [R11.2]    Subjective:    Pt feels pain is under control currently. Per RN: patient is to go to [facility] for to IR for liver biopsy. Seen by surgery earlier today and not deemed candidate for surgical intervention.  promethazine  25 mg IntraMUSCular Once    Vitamin D  2 tablet Oral Daily    gabapentin  300 mg Oral TID    FLUoxetine  20 mg Oral Daily    sodium chloride flush  5-40 mL IntraVENous 2 times per day    enoxaparin  40 mg SubCUTAneous Daily    pantoprazole  40 mg Oral QAM AC    oxyCODONE  5 mg Oral Q6H    cefepime  2,000 mg IntraVENous Q12H     sodium chloride flush, 5-40 mL, PRN  sodium chloride, , PRN  polyethylene glycol, 17 g, Daily PRN  ondansetron, 4 mg, Q8H PRN  HYDROmorphone, 1 mg, Q3H PRN  glucose, 4 tablet, PRN  dextrose bolus, 125 mL, PRN   Or  dextrose bolus, 250 mL, PRN  glucagon (rDNA), 1 mg, PRN  dextrose, 100 mL/hr, PRN  HYDROmorphone, 1.5 mg, Q3H PRN         Objective:    /70   Pulse (!) 118   Temp 97.7 °F (36.5 °C)   Resp 22   Ht 5' 1\" (1.549 m)   Wt 182 lb 14.4 oz (83 kg)   SpO2 94%   BMI 34.56 kg/m²   General Appearance: somnolent but able to be aroused. Does not appear in distress\  Skin: jaudiced  Head: normocephalic and atraumatic  Eyes: pupils equal, round, and reactive to light, extraocular eye movements intact, scleral icterus. Neck: neck supple and non tender without mass   Pulmonary/Chest: clear to auscultation bilaterally- no wheezes, rales or rhonchi, normal air movement, no respiratory distress  Cardiovascular: normal rate, normal S1 and S2 and no carotid bruits  Abdomen: soft, RUQ tenderness.    Extremities: no cyanosis, no clubbing and no edema  Neurologic: no cranial nerve deficit and speech normal      Recent Labs     07/11/22 2047 07/12/22  0616    136   K 4.8 4.8   CL 92* 98   CO2 12* 14*   BUN 22* 25*   CREATININE 1.3* 1.2*   GLUCOSE 111* 71*   CALCIUM 10.6* 9.4       Recent Labs     07/11/22 2047 07/12/22 0215 07/12/22  0616   ALKPHOS 581* 468* 503*   PROT 5.8* 4.9* 5.0*   LABALBU 3.3* 2.9* 2.9*   BILITOT 5.0* 4.4* 4.6*   * 321* 400*   * 157* 180*       Recent Labs     07/11/22 2047 07/12/22  0616   WBC 16.2* 14.1*   RBC 4.54 4.01   HGB 14.2 12.5   HCT 44.7 39.5   MCV 98.5 98.5   MCH 31.3 31.2   MCHC 31.8* 31.6*   RDW 16.3* 16.0*    285   MPV 11.0 10.5        Ref. Range 7/11/2022 20:47 7/12/2022 00:43 7/12/2022 06:16   Lactic Acid Latest Ref Range: 0.5 - 2.2 mmol/L 12.8 (HH) 8.5 (HH) 7.6 (HH)       Radiology:   US GALLBLADDER RUQ   Final Result   Diffusely abnormal liver parenchyma consistent with known diffuse hepatic   metastatic disease. Perihepatic ascites identified         CTA CHEST W CONTRAST   Final Result   No evidence of pulmonary embolism or acute pulmonary abnormality. Sclerotic changes involving the visualized osseous structures consistent with   metastatic disease. This was present on prior study 01/28/2022. Hepatomegaly. Patient has innumerable known liver metastasis that are better   appreciated on CT scan of the abdomen and pelvis. Right breast mass and significant right axillary adenopathy. CT ABDOMEN PELVIS W IV CONTRAST Additional Contrast? None   Final Result   Widely metastatic disease involving the liver and skeleton. Hepatomegaly. Right axillary lymphadenopathy not significantly changed. XR CHEST PORTABLE   Final Result   No acute process.              Assessment:  Principal Problem:    Cholangitis  Active Problems:    Malignant neoplasm of upper-outer quadrant of right breast in female, estrogen receptor negative ([facility] [address]    Breast cancer metastasized to bone, unspecified laterality [facility])    Breast cancer metastasized to liver, unspecified laterality ([facility] [address]  Resolved Problems:    * No resolved hospital problems. *      Plan:    1. Right breast cancer stage II in Jan but progressed despite treatment to stage IV with new bone and liver mets.  -Hem/Onc ordered CT guided biopsy of liver met to send for [facility] One testing, MSI. They also added MS Contin 15 mg bid and increased Dilaudid to 1 mg (from 0.5 mg)   -palliative care consult for goals of care, code status and symptom control    2. Sepsis due to possible intraabdominal infection (cholangitis?)/UTI ?vs lactic acidosis from metastatic disease  -IVF resuscitation  -continue antibiotics in the form of cefepime    3. RAQUEL  -continue to monitor creatinine. Trend 1.3-->1.2  -avoid nephrotoxins. 4. Lactic acidosis  -due to sepsis vs mets  - trend as follows 12.8-->8.5-->7.6    Case discussed with ICU RN on the floor    Case discussed with Dr. Janis Chong of [facility] earlier today. NOTE: This report was transcribed using voice recognition software. Every effort was made to ensure accuracy; however, inadvertent computerized transcription errors may be present.      Electronically signed by Bharathi Perales MD on 7/12/2022 at 2:54 PM

## 2022-07-12 NOTE — CONSULTS
for swelling  Patient not taking: Reported on 2022   Miriam Colbert MD   ondansetron (ZOFRAN) 8 MG tablet TAKE 1 TABLET BY MOUTH EVERY 8 HOURS AS NEEDED FOR NAUSEA OR VOMITING  Patient not taking: Reported on 2022 2/15/22   Historical Provider, MD   ibuprofen (ADVIL;MOTRIN) 600 MG tablet Take 600 mg by mouth every 6 hours as needed for Pain    Historical Provider, MD   FLUoxetine (PROZAC) 20 MG capsule Take 20 mg by mouth daily  20   Historical Provider, MD   Cholecalciferol 50 MCG (2000) CHEW Take 1 capsule by mouth daily     Historical Provider, MD       Allergies   Allergen Reactions    Penicillins        Family History   Problem Relation Age of Onset    No Known Problems Mother     Colon Cancer Father         late 45s    Diabetes Father     High Blood Pressure Father     Alzheimer's Disease Father     Dementia Father     Cancer Father         Melanoma     Breast Cancer Maternal Aunt         76s    Breast Cancer Maternal Grandmother         late 76s, unilateral    Breast Cancer Maternal Aunt         62s       Social History     Tobacco Use    Smoking status: Former Smoker     Packs/day: 0.50     Years: 15.00     Pack years: 7.50     Types: Cigarettes     Start date:      Quit date: 2018     Years since quittin.5    Smokeless tobacco: Never Used    Tobacco comment: Con't no smoking    Vaping Use    Vaping Use: Never used   Substance Use Topics    Alcohol use: Yes     Comment: Very rarely has a drink    Drug use: Yes     Types: Marijuana (Weed)     Comment: Medical          Review of Systems: pertinent ROS listed in HPI, all others negative       PHYSICAL EXAM:    Vitals:    22 0605   BP: 102/63   Pulse: (!) 123   Resp: 26   Temp:    SpO2:        GENERAL:  NAD. A&Ox3. HEAD:  Normocephalic. Atraumatic. EYES:   +scleral icterus. PERRL. LUNGS:  No increased work of breathing.   CARDIOVASCULAR: tachycardic, normotensive  ABDOMEN:  Soft, non-distended, moderately tender RUQ. No guarding, rigidity, rebound. EXTREMITIES:   MAEx4. Atraumatic. No LE edema. SKIN:  Warm and dry      ASSESSMENT/PLAN:  49 y.o. female with triple negative stage IV breast cancer with metastasis to liver and bone, hyperbilirubinemia secondary to innumerable liver metastasis causing liver dysfunction vs cholangitis    CT scan without intra or extra-hepatic biliary ductal dilation   RUQ US pending to evaluate for CBD stone  If no CBD stone nor intrahepatic ductal dilation then unfortunately not candidate for ERCP or IR PTHC for decompression  Continue IVF resuscitation  IV ABx for cholangitis prophylaxsis    Plan will be discussed with Dr. Alena Flores. Alcon Addison DO  Surgery Resident PGY-5  7/12/2022  6:47 AM      General Surgery Consult Note  Mary Jane Ribeiro MD, MS    Patient's Name/Date of Birth: Paige Loredo / 1975    Date: July 12, 2022     Surgeon: Alena Flores MD    Requesting Physician:     Chief Complaint: RUQ pain    Patient Active Problem List   Diagnosis    Malignant neoplasm of upper-outer quadrant of right breast in female, estrogen receptor negative ([address]    Cholangitis       Subjective: As above. I saw and examined the patient and discussed the above HPI with the resident and agree with documented positive and negative findings. Timing is Constnat, radiation to RUQ, alleviated by none and started weeks ago and severity is 7/10. Patient with a history of stage IV metastatic breast cancer with diffuse bony and intra-abdominal liver metastasis. Presented with right upper quadrant pain with been going on for several weeks. Her CT examination showed diffuse hepatic metastasis with ascites. There is no pericholecystic fluid there is no evidence of cholecystitis.   Right upper quadrant ultrasound has revealed no evidence of gallstones nor did it show any evidence of common bile dilation    Objective:  /63   Pulse (!) 123   Temp 97.7 °F (36.5 °C)   Resp 28   Ht 5' 1\" (1.549 m)   Wt 182 lb 14.4 oz (83 kg)   SpO2 93%   BMI 34.56 kg/m²   Labs:  Reviewed by me and relevant abnormalities noted       A complete 10 system review was performed and are otherwise negative unless mentioned in the above HPI. Specific negatives are listed below but may not include all those reviewed. General ROS: negative obtundation, AMS  ENT ROS: negative rhinorrhea, epistaxis  Allergy and Immunology ROS: negative itchy/watery eyes or nasal congestion  Hematological and Lymphatic ROS: negative spontaneous bleeding or bruising  Endocrine ROS: negative  lethargy, mood swings, palpitations or polydipsia/polyuria  Respiratory ROS: negative sputum changes, stridor, tachypnea or wheezing  Cardiovascular ROS: negative for - loss of consciousness, murmur or orthopnea  Gastrointestinal ROS: negative for - hematochezia or hematemesis  Genito-Urinary ROS: negative for -  genital discharge or hematuria  Musculoskeletal ROS: negative for - focal weakness, gangrene  Psych/Neuro ROS: negative for - visual or auditory hallucinations, suicidal ideation    Physical exam:   /63   Pulse (!) 123   Temp 97.7 °F (36.5 °C)   Resp 28   Ht 5' 1\" (1.549 m)   Wt 182 lb 14.4 oz (83 kg)   SpO2 93%   BMI 34.56 kg/m²   General appearance:  NAD, appears stated age  Head: NCAT, PERRLA, EOMI, red conjunctiva  Neck: supple, no masses, trachea midline  Lungs: Equal chest rise bilateral, no retractions, no wheezing  Heart: Reg rate  Abdomen: soft, tender RUQ  Skin; warm and dry, no cyanosis  Gu: no cva tenderness  Extremities: atraumatic, no focal motor deficits, no open wounds  Psych: No tremor, visual hallucinations        Radiology: I reviewed relevant abdominal imaging from this admission and that available in the EMR including CT abd/pel from admission.  My assessment is diffuse hepatic metastasis with ascites    Assessment/Plan:  Rufus Atwood is a 49 y.o. female stage IV triple negative breast cancer with diffuse hepatic metastasis ascites and hepatic insufficiency with acute renal failure  Patient Active Problem List   Diagnosis    Malignant neoplasm of upper-outer quadrant of right breast in female, estrogen receptor negative ([facility] [address]    Cholangitis       Evidence of multisystem organ failure secondary to metastasis. Admission diagnosis suggestive of cholangitis but I am resistant to agree as there is no evidence of common bile dilation or gallstones. If in fact this is bile duct obstruction its likely intrahepatic and ERCP may not be helpful  Diffuse metastasis likely makes it difficult for any PTC drainage as well  Right upper quadrant ultrasounds not suggestive of common bile duct dilation or obstruction  Recommend oncology evaluation palliative   Her right upper quadrant tenderness likely is multifactorial not limited to bony metastasis or hepatic capsule swelling    I have personally participated in a face-to-face history and physical exam on the date of service. Reviewed chart, vitals, labs and radiologic studies. I also participated in medical decision making with the resident/NP on the date of service and I agree with all of the pertinent clinical information, assessment and treatment plan. I have reviewed and edited the note above based on my findings during my history, exam, and decision making. NOTE: This report, in part or full, may have been transcribed using voice recognition software. Every effort was made to ensure accuracy; however, inadvertent computerized transcription errors may be present. Please excuse any transcriptional grammatical or spelling errors that may have escaped my editorial review.     Physician Signature: Electronically signed by Dr. Singh Banks  [phone] (p)  7/12/2022  10:25 AM

## 2022-07-12 NOTE — PROGRESS NOTES
Patient came down to special procedures for liver biopsy. Patient was educated about the amount of radiation used with today's procedure. Patient taken to Cat Scan. Patient positioned supine , secured on Cat Scan table with monitoring devices attached. Patient scanned and images reviewed by Dr Fernando Bonilla     Patient prepped secured and draped. Emotional support given. 1555 - IV Fentanyl medication given    1601 - Procedure start /73 126 26 92% on room air    With the guidance of Cat Scan, needle inserted and core biopsy taken by Dr. Fernando Bonilla     Patient re-scanned and images reviewed by     Puncture site cleansed and dry sterile dressing applied to RUQ.     1608 - Procedure completed /83 127 22 92% on room air    Biopsy sample taken to laboratory.      Total amount of medication given during procedure:   75 mcg of IV Fentanyl    Nurse to nurse given to Yaya Mancia RN, ICU nurse that came down with patient,  Nurse notified of above information    Patient transported back to floor with ICU nurse

## 2022-07-12 NOTE — H&P
[facility] History and Physical    --------------------------------------------------------------------------------------  Assessment / Plan  Past Medical History:   Diagnosis Date    Anxiety     Cancer ([address] 01/2022    Right Breast    Headache      Sepsis  Possible cholangitis  We will admit patient to intensive care unit  Ordered about cholangitis given the presentation with leukocytosis lactic acidosis of 12 and total bilirubin of 5 abnormal LFTs in the setting of increasing right upper quadrant pain. CT of the abdomen did not show intra or extrahepatic biliary dilation but showed liver markedly enlarged with numerous metastatic disease. Admit the patient to ICU  We will start the patient empirically on meropenem  Will collect blood cultures  Suggested to the ED physician that this patient should be transferred to tertiary care facility, ED spoke with surgery team \"Dr Mai Bell accepted patient to stay in the hospital, check US and bilirubin fractions and will be evaluated by surgery team in few hours. Adding Dilaudid for pain control    Stage IV breast cancer  She has triple negative poorly differentiated ductal carcinoma  She is status post 2 cycles of further MS and Cytoxan without improvement  According to patient last evaluation showed development of bone and hepatic metastasis  She follows with the [facility] Dr. Brice Alva    Lactic acidosis  Initiated 12 with an 8.5 after hydration  Could be secondary to sepsis or could be secondary to hepatic involvement with metastasis. Continue to monitor lactic acid level    Acute kidney injury  Baseline creatinine 0.7-0. [address]  Today it is 1.3  We will start IV fluids  mL/h    Borderline Prolonged qtc 461 on ECG this am  Will limit zofran use, she is on SSRI as well    Critical care time excluding procedures was 45 minutes      Please see orders for further plan of care.      Code status  full   DVT prophylaxis Will start subcu Anxiety     Cancer ([facility] [address] 01/2022    Right Breast    Headache      Past Surgical History:   Procedure Laterality Date    ANKLE SURGERY Right     DILATION AND CURETTAGE OF UTERUS      ENDOMETRIAL ABLATION  05/2021    [facility] US PERQ DEVICE SOFT TISSUE PLMT  FIRST LESION  1/6/2022    [facility] US PERQ DEVICE SOFT TISSUE PLMT  FIRST LESION 1/6/2022 [facility]    PORT SURGERY Left 1/26/2022    MEDIPORT INSERTION [facility] Abebe [facility]aufman [facility]  05/2021    US BREAST NEEDLE BIOPSY RIGHT Right 1/6/2022    US BREAST NEEDLE BIOPSY RIGHT 1/6/2022 [facility]     Prior to Admission medications    Medication Sig Start Date End Date Taking? Authorizing Provider   Ferrous Sulfate (IRON) 325 (65 Fe) MG TABS TAKE 1 TABLET BY MOUTH DAILY WITH BREAKFAST 6/9/22   Loyda Donis MD   dexamethasone (DECADRON) 4 MG tablet TAKE 5 TABLETS BY MOUTH THE NIGHT BEFORE CHEMO 5/1/22   Historical Provider, MD   gabapentin (NEURONTIN) 100 MG capsule Take 3 capsules by mouth 3 times daily for 90 days. 5/2/22 7/31/22  Abebe Kaufman MD   Multiple Vitamin (MULTI VITAMIN DAILY PO) Take by mouth daily    Historical Provider, MD   furosemide (LASIX) 20 MG tablet Take 1 tablet daily x 5 days, then take 1 tablet daily as needed for swelling  Patient not taking: Reported on 5/20/2022 4/13/22   Colin Juárez MD   ondansetron (ZOFRAN) 8 MG tablet TAKE 1 TABLET BY MOUTH EVERY 8 HOURS AS NEEDED FOR NAUSEA OR VOMITING  Patient not taking: Reported on 5/20/2022 2/15/22   Historical Provider, MD   ibuprofen (ADVIL;MOTRIN) 600 MG tablet Take 600 mg by mouth every 6 hours as needed for Pain    Historical Provider, MD   FLUoxetine (PROZAC) 20 MG capsule Take 20 mg by mouth daily  9/23/20   Historical Provider, MD   Cholecalciferol 50 MCG (2000 UT) CHEW Take 1 capsule by mouth daily     Historical Provider, MD     Allergies  Penicillins    Social History   reports that she quit smoking about 4 years ago.  Her smoking use included cigarettes. She started smoking about 30 years ago. She has a 7.50 pack-year smoking history. She has never used smokeless tobacco. She reports current alcohol use. She reports current drug use. Drug: Marijuana Browningelijah Reeves). Family History  family history includes Alzheimer's Disease in her father; Breast Cancer in her maternal aunt, maternal aunt, and maternal grandmother; Cancer in her father; Colon Cancer in her father; Dementia in her father; Diabetes in her father; High Blood Pressure in her father; No Known Problems in her mother.     Objective  Physical Exam   Vitals: /76   Pulse (!) 126   Temp 97.6 °F (36.4 °C) (Oral)   Resp 30   SpO2 93%   General: Obese with a lot of abdominal pain, cooperative  Skin: generally warm, dry, and intact, with normal color  HEENT: normocephalic, atraumatic, jaundiced  Respiratory: Shallow and diminished but clear to auscultation bilaterally   Cardiovascular: regular rate and rhythm without murmur / rub / gallop  Abdominal: soft, markedly tender right upper quadrant with rigidity and rebound, nondistended, normoactive bowel sounds  Extremities: 1+ edema or deformity  Neurologic: awake, alert, no gross deficits  Psychiatric: normal affect, cooperative    *Available labs, imaging studies, microbiologic studies, cardiac studies have been reviewed    Electronically signed by Aleksandar Wayne MD on 7/12/2022 at 2:45 AM

## 2022-07-12 NOTE — ED NOTES
Pt okay to have ice chips per Dr. Ayaz Spaulding, pt nausea subsided after receiving morphine, Dr. Ayaz Spaulding informed. Phenergan on hold for now unless needed by pt.       Javid Spearing  07/12/22 0125

## 2022-07-12 NOTE — ED NOTES
Pt's , Jayshree Moscoso would like called with updates and when surgery rounds in AM. His phone number is [phone]     PedritoisidoroScott Regional Hospitalandres  07/12/22 6275

## 2022-07-12 NOTE — ED NOTES
Pharmacy called and informed Dr. Jaye Frazier would like pt to receive meropenem stat.      Bernardo Shah  07/12/22 0246

## 2022-07-13 NOTE — CARE COORDINATION
7/13/2022 Cm transition of care:Compassionate end of life care. DNR-CC, morphine gtt, 2lnc Pastoral care, family at pts bedside.  Electronically signed by TATO Welch on 7/13/2022 at 12:44 PM

## 2022-07-13 NOTE — PROGRESS NOTES
GENERAL SURGERY  DAILY PROGRESS NOTE  7/13/2022    CHIEF COMPLAINT:  Chief Complaint   Patient presents with    Abdominal Pain     Breast CA pt with mets.  Nausea    Shortness of Breath       SUBJECTIVE:  Underwent IR liver biopsy yesterday  Worsening respiratory status, liver, and kidney function  Started on pressors and CPAP    OBJECTIVE:  /81   Pulse (!) 112   Temp (!) 96.4 °F (35.8 °C) (Core)   Resp 27   Ht 5' 1\" (1.549 m)   Wt 182 lb 14.4 oz (83 kg)   SpO2 95%   BMI 34.56 kg/m²     GENERAL:  Appears ill, confused  LUNGS: Increased work of breathing, on CPAP  CARDIOVASCULAR: Tachycardic and hypotensive  ABDOMEN:  Soft, non-distended, tenderness RUQ. No guarding, rigidity, rebound.     ASSESSMENT/PLAN:  49 y.o. female with stage IV triple negative breast cancer with widespread metastatic disease, with worsening multisystem organ failure secondary to metastatic burden; acute liver and kidney failure, respiratory failure, shock requiring vasopressor support    No evidence of cholangitis -- no stones or CBD dilatation on CT or RUQ US  No changes or acute intervention from surgery POV  Continue care per medical teams -- agree with palliative consult  Code status change noted -- now [address] [place], DO  Surgery Resident PGY-5  7/13/2022  12:17 PM      As above  Poor prognosis

## 2022-07-13 NOTE — PROGRESS NOTES
Palliative Medicine  Progress Note    Decision made to pursue comfort care and comfort medications in place. No immediate palliative need. Recommend hospice consult if indicated. [facility] APRN-CNP  Palliative Medicine    Note: This report was completed using computerNutrigreen voiced recognition software. Every effort has been made to ensure accuracy; however, inadvertent computerized transcription errors may be present.

## 2022-07-13 NOTE — PROGRESS NOTES
CRITICAL CARE PROGRESS NOTE    Nurse Aaron Montejo and me met with the patient's family to discuss clinical condition and her wishes for end of life. The patient's  states she would not want to be intubated, confirmed by her daughters.      Code status changed to [facility]    Luis Gonzales MD  Pulmonary and Critical Care Medicine

## 2022-07-13 NOTE — PROGRESS NOTES
[address] [place], [place] [address]ist   ICU Progress Note    Admitting Date and Time: 7/11/2022  7:52 PM  Admit Dx: Cholangitis [K83.09]  Elevated lactic acid level [R79.89]  Right upper quadrant abdominal pain [R10.11]  Intractable nausea and vomiting [R11.2]    Subjective:    7/12: Pt feels pain is under control currently. Per RN, patient is to go to down for to IR for liver biopsy. Seen by surgery earlier today and not deemed candidate for surgical intervention. 7/13: Pt transitioned to comfort care. Earlier today was on Levophed but that was weaned off by later this morning. Patient now on morphine drip and Versed prn for agitation.  promethazine  25 mg IntraMUSCular Once     haloperidol lactate, 5 mg, Q1H PRN  midazolam, 0.5 mg, Q15 Min PRN         Objective:    BP (!) 45/30   Pulse 94   Temp 96.8 °F (36 °C) (Core)   Resp 14   Ht 5' 1\" (1.549 m)   Wt 182 lb 14.4 oz (83 kg)   SpO2 (!) 70%   BMI 34.56 kg/m²   Family at bedside having lopez so did not disturb      Recent Labs     07/11/22 2047 07/12/22  0616 07/13/22  0934    136 134   K 4.8 4.8 7.3*   CL 92* 98 87*   CO2 12* 14* 7*   BUN 22* 25* 48*   CREATININE 1.3* 1.2* 2.7*   GLUCOSE 111* 71* 114*   CALCIUM 10.6* 9.4 9.1       Recent Labs     07/12/22 0215 07/12/22  0616 07/13/22  0934   ALKPHOS 468* 503* 639*   PROT 4.9* 5.0* 5.0*   LABALBU 2.9* 2.9* 3.1*   BILITOT 4.4* 4.6* 6.8*   * 400* 4,586*   * 180* 1,428*       Recent Labs     07/11/22 2047 07/12/22  0616 07/13/22  0934   WBC 16.2* 14.1* 17.3*   RBC 4.54 4.01 4.11   HGB 14.2 12.5 12.7   HCT 44.7 39.5 43.2   MCV 98.5 98.5 105.1*   MCH 31.3 31.2 30.9   MCHC 31.8* 31.6* 29.4*   RDW 16.3* 16.0* 16.7*    285 258   MPV 11.0 10.5 10.0          Ref.  Range 7/11/2022 20:47 7/12/2022 00:43 7/12/2022 06:16 7/13/2022 09:34   Lactic Acid Latest Ref Range: 0.5 - 2.2 mmol/L 12.8 (HH) 8.5 (HH) 7.6 (HH) 20.3 ()     Radiology:   IR BIOPSY LIVER PERCUTANEOUS   Final Result 1. Successful CT-guided 18 gauge core biopsy of the right hepatic lobe. 2. Multiple too numerous to count metastatic lesions seen throughout the   right and left hepatic lobes. 3. Administration of conscious sedation. US GALLBLADDER RUQ   Final Result   Diffusely abnormal liver parenchyma consistent with known diffuse hepatic   metastatic disease. Perihepatic ascites identified         CTA CHEST W CONTRAST   Final Result   No evidence of pulmonary embolism or acute pulmonary abnormality. Sclerotic changes involving the visualized osseous structures consistent with   metastatic disease. This was present on prior study 01/28/2022. Hepatomegaly. Patient has innumerable known liver metastasis that are better   appreciated on CT scan of the abdomen and pelvis. Right breast mass and significant right axillary adenopathy. CT ABDOMEN PELVIS W IV CONTRAST Additional Contrast? None   Final Result   Widely metastatic disease involving the liver and skeleton. Hepatomegaly. Right axillary lymphadenopathy not significantly changed. XR CHEST PORTABLE   Final Result   No acute process. Assessment:  Principal Problem:    Cholangitis  Active Problems:    Malignant neoplasm of upper-outer quadrant of right breast in female, estrogen receptor negative ([address]    Breast cancer metastasized to bone, unspecified laterality (HCC)    Breast cancer metastasized to liver, unspecified laterality ([address]    RAQUEL (acute kidney injury) ([address]  Resolved Problems:    * No resolved hospital problems. *      Plan:    1. Right breast cancer stage II in Jan but progressed despite treatment to stage IV with new bone and liver mets. Patient is now actively dying.  -Hem/Onc ordered CT guided biopsy of liver met to send for [employer] [facility] testing, MSI.  Yesterday, they added MS Contin 15 mg bid and increased Dilaudid to 1 mg (from 0.5 mg)   -palliative care consult for goals of care, code status and symptom control    2. Sepsis due to possible intraabdominal infection (cholangitis?)/UTI ?vs lactic acidosis from metastatic disease  -IVF resuscitation  -continue antibiotics in the form of cefepime    3. Acute renal failure  -continue to monitor creatinine. Trend 1.3-->1.2-->2.7  -avoid nephrotoxins. 4. Lactic acidosis (worsening)  -due to sepsis vs mets  - trend as follows 12.8-->8.5-->7.6-->20.3    Case discussed with ICU RN on the floor    Case discussed with Dr. Cecilio Beard of critical care earlier today. Case discussed with palliative care CNP on the floor. Case discussed with Dr. Demetrice Foley of [facility] on the floor. NOTE: This report was transcribed using voice recognition software. Every effort was made to ensure accuracy; however, inadvertent computerized transcription errors may be present.      Electronically signed by Olamide Velez MD on 7/13/2022 at 6:43 PM

## 2022-07-13 NOTE — PLAN OF CARE
Problem: Discharge Planning  Goal: Discharge to home or other facility with appropriate resources  Outcome: Not Progressing     Problem: Pain  Goal: Verbalizes/displays adequate comfort level or baseline comfort level  Outcome: Not Progressing     Problem: Skin/Tissue Integrity  Goal: Absence of new skin breakdown  Description: 1. Monitor for areas of redness and/or skin breakdown  2. Assess vascular access sites hourly  3. Every 4-6 hours minimum:  Change oxygen saturation probe site  4. Every 4-6 hours:  If on nasal continuous positive airway pressure, respiratory therapy assess nares and determine need for appliance change or resting period.   Outcome: Progressing     Problem: Safety - Adult  Goal: Free from fall injury  Outcome: Progressing     Problem: ABCDS Injury Assessment  Goal: Absence of physical injury  Outcome: Progressing

## 2022-07-13 NOTE — CONSULTS
adventitious sounds auscultated, with accessory muscle use  CV: Regular rate, no murmurs, no JVD, no leg edema  Abdomen: Soft, non tender, + bowel sounds, no lesions  Skin: Hydrated, adequate turgor, no rash, capillary refill <2 seconds  Extremities: Muscular strength 1/5 in 4 limbs, moves 4 limbs spontaneously, distal pulses present  Neurology: Lethargic, follows commands, moves 4 limbs on command and spontaneously, neck is supple, no meningitic signs present. Last 3 CMP:  Recent Labs     07/11/22 2047 07/12/22  0215 07/12/22  0616     --  136   K 4.8  --  4.8   CL 92*  --  98   CO2 12*  --  14*   BUN 22*  --  25*   CREATININE 1.3*  --  1.2*   GLUCOSE 111*  --  71*   CALCIUM 10.6*  --  9.4   PROT 5.8* 4.9* 5.0*   LABALBU 3.3* 2.9* 2.9*   BILITOT 5.0* 4.4* 4.6*   ALKPHOS 581* 468* 503*   * 321* 400*   * 157* 180*       Recent Labs     07/12/22  0616   WBC 14.1*   RBC 4.01   HGB 12.5   HCT 39.5   MCV 98.5   MCH 31.2   MCHC 31.6*   RDW 16.0*      MPV 10.5       No results for input(s): BC in the last 72 hours. No results for input(s): [employer] in the last 72 hours.     24 HR INTAKE/OUTPUT:      Intake/Output Summary (Last 24 hours) at 7/13/2022 0931  Last data filed at 7/13/2022 0523  Gross per 24 hour   Intake 4210.52 ml   Output 1 ml   Net 4209.52 ml     MEDICATIONS:   promethazine  25 mg IntraMUSCular Once    Vitamin D  2 tablet Oral Daily    gabapentin  300 mg Oral TID    FLUoxetine  20 mg Oral Daily    sodium chloride flush  5-40 mL IntraVENous 2 times per day    enoxaparin  40 mg SubCUTAneous Daily    pantoprazole  40 mg Oral QAM AC    oxyCODONE  5 mg Oral Q6H    cefepime  2,000 mg IntraVENous Q12H      sodium chloride      dextrose      sodium bicarbonate infusion 150 mL/hr at 07/13/22 0854     sodium chloride flush, sodium chloride, polyethylene glycol, ondansetron, HYDROmorphone, glucose, dextrose bolus **OR** dextrose bolus, glucagon (rDNA), dextrose, HYDROmorphone    OBJECTIVE:  Vitals:    07/13/22 0900   BP: (!) 81/49   Pulse: (!) 114   Resp: (!) 48   Temp:    SpO2: 98%           O2 Device: None (Room air)        LABS:  WBC   Date Value Ref Range Status   07/12/2022 14.1 (H) 4.5 - 11.5 E9/L Final   07/11/2022 16.2 (H) 4.5 - 11.5 E9/L Final   04/15/2022 11.6 10^3/mL Final     Hemoglobin   Date Value Ref Range Status   07/12/2022 12.5 11.5 - 15.5 g/dL Final   07/11/2022 14.2 11.5 - 15.5 g/dL Final   04/15/2022 12.1 12.0 - 16.0 g/dL Final     Hematocrit   Date Value Ref Range Status   07/12/2022 39.5 34.0 - 48.0 % Final   07/11/2022 44.7 34.0 - 48.0 % Final   04/15/2022 38.4 36 - 46 % Final     MCV   Date Value Ref Range Status   07/12/2022 98.5 80.0 - 99.9 fL Final   07/11/2022 98.5 80.0 - 99.9 fL Final   04/15/2022 82.1 fL Final     Platelets   Date Value Ref Range Status   07/12/2022 285 130 - 450 E9/L Final   07/11/2022 376 130 - 450 E9/L Final   04/15/2022 218 K/µL Final     Sodium   Date Value Ref Range Status   07/12/2022 136 132 - 146 mmol/L Final   07/11/2022 135 132 - 146 mmol/L Final   04/15/2022 137 mmol/L Final     Potassium   Date Value Ref Range Status   04/15/2022 3.8 mmol/L Final   04/01/2022 4.1 mmol/L Final   02/11/2022 3.9 mmol/L Final     Potassium reflex Magnesium   Date Value Ref Range Status   07/12/2022 4.8 3.5 - 5.0 mmol/L Final   07/11/2022 4.8 3.5 - 5.0 mmol/L Final     Chloride   Date Value Ref Range Status   07/12/2022 98 98 - 107 mmol/L Final   07/11/2022 92 (L) 98 - 107 mmol/L Final   04/15/2022 102 mmol/L Final     CO2   Date Value Ref Range Status   07/12/2022 14 (L) 22 - 29 mmol/L Final   07/11/2022 12 (L) 22 - 29 mmol/L Final   04/15/2022 23 mmol/L Final     BUN   Date Value Ref Range Status   07/12/2022 25 (H) 6 - 20 mg/dL Final   07/11/2022 22 (H) 6 - 20 mg/dL Final   04/15/2022 16 mg/dL Final     CREATININE   Date Value Ref Range Status   07/12/2022 1.2 (H) 0.5 - 1.0 mg/dL Final   07/11/2022 1.3 (H) 0.5 - 1.0 mg/dL Final 04/15/2022 0.77  Final     Glucose   Date Value Ref Range Status   07/12/2022 71 (L) 74 - 99 mg/dL Final   07/11/2022 111 (H) 74 - 99 mg/dL Final   04/15/2022 254 mg/dL Final     Calcium   Date Value Ref Range Status   07/12/2022 9.4 8.6 - 10.2 mg/dL Final   07/11/2022 10.6 (H) 8.6 - 10.2 mg/dL Final   04/15/2022 9.2 mg/dL Final     Total Protein   Date Value Ref Range Status   07/12/2022 5.0 (L) 6.4 - 8.3 g/dL Final   07/12/2022 4.9 (L) 6.4 - 8.3 g/dL Final   07/11/2022 5.8 (L) 6.4 - 8.3 g/dL Final     Albumin   Date Value Ref Range Status   07/12/2022 2.9 (L) 3.5 - 5.2 g/dL Final   07/12/2022 2.9 (L) 3.5 - 5.2 g/dL Final   07/11/2022 3.3 (L) 3.5 - 5.2 g/dL Final     Total Bilirubin   Date Value Ref Range Status   07/12/2022 4.6 (H) 0.0 - 1.2 mg/dL Final   07/12/2022 4.4 (H) 0.0 - 1.2 mg/dL Final   07/11/2022 5.0 (H) 0.0 - 1.2 mg/dL Final     Alkaline Phosphatase   Date Value Ref Range Status   07/12/2022 503 (H) 35 - 104 U/L Final   07/12/2022 468 (H) 35 - 104 U/L Final   07/11/2022 581 (H) 35 - 104 U/L Final     AST   Date Value Ref Range Status   07/12/2022 400 (H) 0 - 31 U/L Final   07/12/2022 321 (H) 0 - 31 U/L Final   07/11/2022 247 (H) 0 - 31 U/L Final     Comment:     Specimen is slightly Hemolyzed. Result may be artificially increased. ALT   Date Value Ref Range Status   07/12/2022 180 (H) 0 - 32 U/L Final   07/12/2022 157 (H) 0 - 32 U/L Final   07/11/2022 135 (H) 0 - 32 U/L Final     GFR Non-   Date Value Ref Range Status   07/12/2022 48 >=60 mL/min/1.73 Final     Comment:     Chronic Kidney Disease: less than 60 ml/min/1.73 sq.m. Kidney Failure: less than 15 ml/min/1.73 sq.m. Results valid for patients 18 years and older. 07/11/2022 44 >=60 mL/min/1.73 Final     Comment:     Chronic Kidney Disease: less than 60 ml/min/1.73 sq.m. Kidney Failure: less than 15 ml/min/1.73 sq.m. Results valid for patients 18 years and older.      01/14/2022 >60 >=60 mL/min/1.73 Final     Comment:     Chronic Kidney Disease: less than 60 ml/min/1.73 sq.m. Kidney Failure: less than 15 ml/min/1.73 sq.m. Results valid for patients 18 years and older. Gfr Calculated   Date Value Ref Range Status   04/15/2022 91  Final   04/01/2022 78  Final   02/11/2022 63  Final     GFR    Date Value Ref Range Status   07/12/2022 58  Final   07/11/2022 53  Final   01/14/2022 >60  Final     No results found for: MG  No results found for: PHOS  No results for input(s): PH, PO2, PCO2, HCO3, BE, O2SAT in the last 72 hours. RADIOLOGY:  IR BIOPSY LIVER PERCUTANEOUS   Final Result   1. Successful CT-guided 18 gauge core biopsy of the right hepatic lobe. 2. Multiple too numerous to count metastatic lesions seen throughout the   right and left hepatic lobes. 3. Administration of conscious sedation. US GALLBLADDER RUQ   Final Result   Diffusely abnormal liver parenchyma consistent with known diffuse hepatic   metastatic disease. Perihepatic ascites identified         CTA CHEST W CONTRAST   Final Result   No evidence of pulmonary embolism or acute pulmonary abnormality. Sclerotic changes involving the visualized osseous structures consistent with   metastatic disease. This was present on prior study 01/28/2022. Hepatomegaly. Patient has innumerable known liver metastasis that are better   appreciated on CT scan of the abdomen and pelvis. Right breast mass and significant right axillary adenopathy. CT ABDOMEN PELVIS W IV CONTRAST Additional Contrast? None   Final Result   Widely metastatic disease involving the liver and skeleton. Hepatomegaly. Right axillary lymphadenopathy not significantly changed. XR CHEST PORTABLE   Final Result   No acute process.            PROBLEM LIST:  Principal Problem:    Cholangitis  Active Problems:    Breast cancer metastasized to bone, unspecified laterality ([address]    Breast cancer metastasized to liver, unspecified laterality ([address]    RAQUEL (acute kidney injury) ([address]    Malignant neoplasm of upper-outer quadrant of right breast in female, estrogen receptor negative ([address]  Resolved Problems:    * No resolved hospital problems. *      ATTESTATION:  ICU Staff Physician note of personal involvement in Care  As the attending physician, I certify that I personally reviewed the patients history and personnally examined the patient to confirm the physical findings described above,  And that I reviewed the relevant imaging studies and available reports. I also discussed the differential diagnosis and all of the proposed management plans with the patient and individuals accompanying the patient to this visit. They had the opportunity to ask questions about the proposed management plans and to have those questions answered. This patient has a high probability of sudden, clinically significant deterioration, which requires the highest level of physician preparedness to intervene urgently. I managed/supervised life or organ supporting interventions that required frequent physician assessment. I devoted my full attention to the direct care of this patient for the amount of time indicated below. Time I spent with the family or surrogate(s) is included only if the patient was incapable of providing the necessary information or participating in medical decisions - Time devoted to teaching and to any procedures I billed separately is not included.      CRITICAL CARE TIME:  30 minutes    Arnoldo Lester MD  Pulmonary and Critical Care Medicine

## 2022-07-14 LAB — URINE CULTURE, ROUTINE: NORMAL

## 2022-07-17 LAB
BLOOD CULTURE, ROUTINE: NORMAL
CULTURE, BLOOD 2: NORMAL

## 2022-07-20 NOTE — PROGRESS NOTES
Physician Progress Note      Bree Jim  CSN #:                  [id number]  :                       1975  ADMIT DATE:       2022 7:52 PM  [address] DATE:        2022 8:57 PM  RESPONDING  PROVIDER #:        Malik Valenzuela MD        QUERY TEXT:    Type of Shock: Please provide further specificity, if known. Clinical indicators include: organ failure, shock  Options provided:  -- Cardiogenic shock  -- Septic shock  -- Hypovolemic shock  -- Hemorrhagic shock due to trauma  -- Hemorrhagic shock related to surgery  -- Anaphylactic shock  -- Other - I will add my own diagnosis  -- Disagree - Not applicable / Not valid  -- Disagree - Clinically Unable to determine / Unknown        PROVIDER RESPONSE TEXT:    Provider was unable to determine a response for this query.   Metastatic liver disease likely contributing to biliary obstruction and   multisystem organ failure      Electronically signed by:  Malik Valenzuela MD 2022 8:09 AM

## 2022-08-17 PROBLEM — R10.11 RIGHT UPPER QUADRANT ABDOMINAL PAIN: Status: ACTIVE | Noted: 2022-08-17

## 2022-08-17 NOTE — DISCHARGE SUMMARY
upper quadrant on the right side of the abdomen. Pain is rated at 8/10 continuous dull aching in those areas associated with nausea vomiting and diminished p.o. intake. She did not check her temperature but she has chills and feels cold all the time. In the emergency room today her chemistry showed elevated serum creatinine at 1.3 she had lactic acidosis 12.8 abnormal liver function test with albumin 3.3, alk phosphatase 581,   bilirubin of 5 lipase was 11 white blood cell count was 16,000 H&H 14 and 44 platelets 797. CT scan of the abdomen showed marked hepatomegaly with metastatic disease no clear intra or extrahepatic biliary dilatation. Hospital Course: 48 yo female admitted as per above details. Patients condition was determined to be terminal per treating oncologist. Patient was started on morphine drip and palliative measures were taken. Patient   on . Patient pronounced by nocturnist.    Discharge Exam:  Vitals:    22 1700 22 1800 22 1900 22 2000   BP: (!) 48/26 (!) 45/30 (!) 37/23 (!) 43/20   Pulse: 96 94 93 85   Resp: 17 14 15 21   Temp:    (!) 95.9 °F (35.5 °C)   TempSrc:    Axillary   SpO2: 91% (!) 70% (!) 85% (!) 64%   Weight:       Height:         As per exam done by nursing/nocturnist to confirm death.     LABS:           Recent Labs     22  0616 22  0934    136 134   K 4.8 4.8 7.3*   CL 92* 98 87*   CO2 12* 14* 7*   BUN 22* 25* 48*   CREATININE 1.3* 1.2* 2.7*   GLUCOSE 111* 71* 114*   CALCIUM 10.6* 9.4 9.1               Recent Labs     22  0215 22  0616 22  0934   ALKPHOS 468* 503* 639*   PROT 4.9* 5.0* 5.0*   LABALBU 2.9* 2.9* 3.1*   BILITOT 4.4* 4.6* 6.8*   * 400* 4,586*   * 180* 1,428*               Recent Labs     22  0616 22  0934   WBC 16.2* 14.1* 17.3*   RBC 4.54 4.01 4.11   HGB 14.2 12.5 12.7   HCT 44.7 39.5 43.2   MCV 98.5 98.5 105.1*   MCH 31.3 31.2 30.9   MCHC 31.8* 31.6* 29.4*   RDW 16.3* 16.0* 16.7*    285 258   MPV 11.0 10.5 10.0              Ref. Range 7/11/2022 20:47 7/12/2022 00:43 7/12/2022 06:16 7/13/2022 09:34   Lactic Acid Latest Ref Range: 0.5 - 2.2 mmol/L 12.8 (HH) 8.5 (HH) 7.6 (HH) 20.3 (HH)      Imaging:      CT ABDOMEN PELVIS W IV CONTRAST Additional Contrast? None    Result Date: 7/11/2022  EXAMINATION: CT OF THE ABDOMEN AND PELVIS WITH CONTRAST7/11/2022 10:42 pm TECHNIQUE: CT of the abdomen and pelvis was performed with the administration of intravenous contrast. Multiplanar reformatted images are provided for review. Automated exposure control, iterative reconstruction, and/or weight based adjustment of the mA/kV was utilized to reduce the radiation dose to as low as reasonably achievable. COMPARISON: January 2022 HISTORY: ORDERING SYSTEM PROVIDED HISTORY: diffuse abdominal pain, nausea and vomiting TECHNOLOGIST PROVIDED HISTORY: Reason for exam:->diffuse abdominal pain, nausea and vomiting Additional Contrast?->None Decision Support Exception - unselect if not a suspected or confirmed emergency medical condition->Emergency Medical Condition (MA) FINDINGS: THORACIC STRUCTURES: Unremarkable. LIVER: The liver is markedly enlarged with numerous hepatic metastasis identified. No intra or extrahepatic bile duct dilation. GALL BLADDER: Unremarkable. SPLEEN:  Unremarkable. PANCREAS:  Unremarkable. ADRENAL GLANDS:  Unremarkable. ESOPHAGUS AND STOMACH:  Unremarkable. BOWEL: Small bowel:  Unremarkable. Large bowel: The colon and rectum are of normal course and caliber. The appendix is within normal limits. There is no intraperitoneal free air. There is trace perihepatic and pelvic ascites. URINARY/GENITAL TRACT: Kidneys:  The kidneys are unremarkable. .  No evidence of hydronephrosis, renal calcifications or solid renal mass. Ureters: The ureters are normal course and caliber. There is no evidence of ureter calculus/calculi. URINARY BLADDER:  The urinary bladder is well distended without wall thickening or focal mass. REPRODUCTIVE ORGANS:  Unremarkable. BLOOD VESSELS: Unremarkable given patients age. LYMPH NODES: There is axillary lymphadenopathy in the right axilla, previously identified on the January study. ABDOMINAL WALL & SOFT TISSUES:  Unremarkable. OSSEOUS STRUCTURES: There are diffuse osseous metastasis in every visualized vertebral level and throughout the pelvis. Widely metastatic disease involving the liver and skeleton. Hepatomegaly. Right axillary lymphadenopathy not significantly changed. US GALLBLADDER RUQ    Result Date: 7/12/2022  EXAMINATION: RIGHT UPPER QUADRANT ULTRASOUND 7/12/2022 7:58 am COMPARISON: CT dated 07/11/2022 HISTORY: ORDERING SYSTEM PROVIDED HISTORY: r/o biliary obstruction TECHNOLOGIST PROVIDED HISTORY: Reason for exam:->r/o biliary obstruction What reading provider will be dictating this exam?->CRC FINDINGS: LIVER:  Liver is abnormal in heterogeneity throughout the liver consistent of known diffusely hepatic metastatic disease limited due to significant heterogeneity of echogenicity. BILIARY SYSTEM:  Limited evaluation gallbladder and biliary tree. Common bile duct is within normal limits measuring 3 mm. RIGHT KIDNEY: The right kidney is grossly unremarkable without evidence of hydronephrosis. PANCREAS:  Visualized portions of the pancreas are unremarkable. OTHER: Right upper quadrant ascites present     Diffusely abnormal liver parenchyma consistent with known diffuse hepatic metastatic disease. Perihepatic ascites identified     XR CHEST PORTABLE    Result Date: 7/11/2022  EXAMINATION: ONE XRAY VIEW OF THE CHEST 7/11/2022 8:25 pm COMPARISON: None. HISTORY: ORDERING SYSTEM PROVIDED HISTORY: shortness of breath TECHNOLOGIST PROVIDED HISTORY: Reason for exam:->shortness of breath FINDINGS: The lungs are without acute focal process. There is no effusion or pneumothorax.  The cardiomediastinal silhouette is without acute process. The osseous structures are without acute process. Left-sided port a catheter tip in the right atrium. No acute process. CTA CHEST W CONTRAST    Result Date: 7/12/2022  EXAMINATION: CTA OF THE CHEST 7/11/2022 10:42 pm TECHNIQUE: CTA of the chest was performed after the administration of intravenous contrast.  Multiplanar reformatted images are provided for review. MIP images are provided for review. Automated exposure control, iterative reconstruction, and/or weight based adjustment of the mA/kV was utilized to reduce the radiation dose to as low as reasonably achievable. COMPARISON: None. HISTORY: ORDERING SYSTEM PROVIDED HISTORY: shortness of breath, r/o PE TECHNOLOGIST PROVIDED HISTORY: Reason for exam:->shortness of breath, r/o PE Decision Support Exception - unselect if not a suspected or confirmed emergency medical condition->Emergency Medical Condition (MA) FINDINGS: Pulmonary Arteries: Pulmonary arteries are adequately opacified for evaluation. No evidence of intraluminal filling defect to suggest pulmonary embolism. Main pulmonary artery is normal in caliber. Mediastinum: No evidence of mediastinal lymphadenopathy. The heart and pericardium demonstrate no acute abnormality. There is no acute abnormality of the thoracic aorta. Lungs/pleura: The lungs are without acute process. No focal consolidation or pulmonary edema. No evidence of pleural effusion or pneumothorax. Upper Abdomen: Hepatomegaly. Soft Tissues/Bones: Sclerotic changes involving the visualized osseous structures. Large asymmetrical density involving the right breast compared to the visualized left parenchyma. Large right axillary/right breast outer quadrant masses. No evidence of pulmonary embolism or acute pulmonary abnormality. Sclerotic changes involving the visualized osseous structures consistent with metastatic disease. This was present on prior study 01/28/2022. Hepatomegaly. Patient has innumerable known liver metastasis that are better appreciated on CT scan of the abdomen and pelvis. Right breast mass and significant right axillary adenopathy. IR BIOPSY LIVER PERCUTANEOUS    Addendum Date: 7/27/2022    ADDENDUM: Dose modulation, reconstruction and/or weight based adjustment the mA/kv was utilized to reduce the radiation dose to low as reasonably achievable. Result Date: 7/27/2022  PROCEDURE: IR BIOPSY LIVER PERCUTANEOUS MODERATE CONSCIOUS SEDATION 7/12/2022 HISTORY: ORDERING SYSTEM PROVIDED HISTORY: Liver met from breast ca TECHNOLOGIST PROVIDED HISTORY: Core samples send for [facility] testing and [facility] Reason for exam:->Liver met from breast ca TECHNIQUE: CT-guided CONTRAST: None SEDATION: Moderate sedation was ordered and supervised by the attending with physician face-to-face monitoring. Medications were provided and recorded by Radiology nurses. The administration, documentation and monitoring of IV conscious sedation was under my direct supervision for time frame of 20 minutes. 75 mcg of IV fentanyl was administered. Interventional radiology nursing staff monitored the patient throughout the entire examination. FLUOROSCOPY DOSE AND TYPE OR TIME AND EXPOSURES: CT-guided procedure DESCRIPTION OF PROCEDURE: Informed consent was obtained after a detailed explanation of the procedure including risks, benefits, and alternatives. Universal protocol was observed. Sterile gowns, masks, hats and gloves utilized for maximal sterile barrier. FINDINGS: The patient was placed on the CT table in the supine position images axial images through the upper abdomen were obtained. Multiple too numerous to count low attenuated lesions are seen throughout the liver. Following the uneventful administration of lidocaine I introduced an 18 gauge core biopsy needle into the right hepatic lobe. A total of 4 random 18 gauge core biopsies were obtained and placed in formalin.  The patient tolerated the procedure well and no complications. 1. Successful CT-guided 18 gauge core biopsy of the right hepatic lobe. 2. Multiple too numerous to count metastatic lesions seen throughout the right and left hepatic lobes. 3. Administration of conscious sedation. Note that less than 30 minutes was spent in preparing discharge papers, discussing discharge with patient, medication review, etc.    NOTE: This report was transcribed using voice recognition software. Every effort was made to ensure accuracy; however, inadvertent computerized transcription errors may be present.      Signed:  Electronically signed by Brenda Moay MD on 8/17/2022 at 2:56 PM

## 2024-09-05 NOTE — PROGRESS NOTES
Progress Note    Subjective:  Patient terminal.  Morphine drip and palliative measures taken. Family is at the bedside. Support given to  and the daughter. Answered any questions that they had. Objective:    BP (!) 45/30   Pulse 94   Temp 96.8 °F (36 °C) (Core)   Resp 14   Ht 5' 1\" (1.549 m)   Wt 182 lb 14.4 oz (83 kg)   SpO2 (!) 70%   BMI 34.56 kg/m²     General: Terminally ill woman with shallow breathing. No apparent distress. HEENT: Eyes closed. Heart:  RRR  Lungs: Shallow breathing. Abd: bowel sounds present, nontender, nondistended, no masses  Extrem: Cool extremities. CBC:   Lab Results   Component Value Date/Time    WBC 17.3 07/13/2022 09:34 AM    RBC 4.11 07/13/2022 09:34 AM    HGB 12.7 07/13/2022 09:34 AM    HCT 43.2 07/13/2022 09:34 AM    .1 07/13/2022 09:34 AM    MCH 30.9 07/13/2022 09:34 AM    MCHC 29.4 07/13/2022 09:34 AM    RDW 16.7 07/13/2022 09:34 AM     07/13/2022 09:34 AM    MPV 10.0 07/13/2022 09:34 AM     CMP:    Lab Results   Component Value Date/Time     07/13/2022 09:34 AM    K 7.3 07/13/2022 09:34 AM    K 4.8 07/12/2022 06:16 AM    CL 87 07/13/2022 09:34 AM    CO2 7 07/13/2022 09:34 AM    BUN 48 07/13/2022 09:34 AM    CREATININE 2.7 07/13/2022 09:34 AM    GFRAA 23 07/13/2022 09:34 AM    LABGLOM 19 07/13/2022 09:34 AM    GLUCOSE 114 07/13/2022 09:34 AM    PROT 5.0 07/13/2022 09:34 AM    LABALBU 3.1 07/13/2022 09:34 AM    CALCIUM 9.1 07/13/2022 09:34 AM    BILITOT 6.8 07/13/2022 09:34 AM    ALKPHOS 639 07/13/2022 09:34 AM    AST 4,586 07/13/2022 09:34 AM    ALT 1,428 07/13/2022 09:34 AM            IR BIOPSY LIVER PERCUTANEOUS   Final Result   1. Successful CT-guided 18 gauge core biopsy of the right hepatic lobe. 2. Multiple too numerous to count metastatic lesions seen throughout the   right and left hepatic lobes. 3. Administration of conscious sedation.          US GALLBLADDER RUQ   Final Result   Diffusely abnormal liver parenchyma consistent with known diffuse hepatic   metastatic disease. Perihepatic ascites identified         CTA CHEST W CONTRAST   Final Result   No evidence of pulmonary embolism or acute pulmonary abnormality. Sclerotic changes involving the visualized osseous structures consistent with   metastatic disease. This was present on prior study 01/28/2022. Hepatomegaly. Patient has innumerable known liver metastasis that are better   appreciated on CT scan of the abdomen and pelvis. Right breast mass and significant right axillary adenopathy. CT ABDOMEN PELVIS W IV CONTRAST Additional Contrast? None   Final Result   Widely metastatic disease involving the liver and skeleton. Hepatomegaly. Right axillary lymphadenopathy not significantly changed. XR CHEST PORTABLE   Final Result   No acute process. Current Facility-Administered Medications:     haloperidol lactate (HALDOL) injection 5 mg, 5 mg, IntraVENous, Q1H PRN, Radha Llamas MD, 5 mg at 07/13/22 1354    morphine (PF) 100 mg in sodium chloride 0.9 % 100 mL infusion, 0.5-10 mg/hr, IntraVENous, Continuous, Radha Llamas MD, Last Rate: 7 mL/hr at 07/13/22 1829, 7 mg/hr at 07/13/22 1829    midazolam (VERSED) injection 0.5 mg, 0.5 mg, IntraVENous, Q15 Min PRN, Radha Llamas MD, 0.5 mg at 07/13/22 1825    promethazine (PHENERGAN) injection 25 mg, 25 mg, IntraMUSCular, Once, Nay Lowe MD    Assessment:    Principal Problem:    Cholangitis  Active Problems:    Breast cancer metastasized to bone, unspecified laterality ([address]    Breast cancer metastasized to liver, unspecified laterality ([address]    RAQUEL (acute kidney injury) ([address]    Malignant neoplasm of upper-outer quadrant of right breast in female, estrogen receptor negative ([address]  Resolved Problems:    * No resolved hospital problems. *      48-year-old woman with metastatic breast cancer.   She was diagnosed with stage II breast cancer January 2022.  CT scans - January 2022 for any distant metastatic disease. She began neoadjuvant chemotherapy with Adriamycin and Cytoxan followed by dose dense Taxol. About the time she was finishing the Taxol the breast mass and axillary lymph node mass started increasing in size. We did CT scans and she was found to have extensive metastatic disease with involvement of the axial and appendicular skeleton as well as extensive diffuse involvement of the liver with more than 100 lesions. Currently patient terminally ill with multiorgan failure. Plan:  Family at the bedside. Patient terminally ill from her metastatic breast cancer. Emotional and psychological support given to the family. All questions answered.         Electronically signed by Smooth Lynn MD on 7/13/2022 at 7:05 PM [Takes medication as prescribed] : takes [None] : Patient does not have any barriers to medication adherence [Yes] : Reviewed medication list for presence of high-risk medications. [Sedatives] : sedatives

## (undated) DEVICE — SUTURE BOOTIES, YELLOW, STERILE, 5 PAIR/PAD; 5 PADS/BOX: Brand: KEY SURGICAL SUTURE BOOTIES

## (undated) DEVICE — LABEL MED 4 IN SURG PANEL W/ PEN STRL

## (undated) DEVICE — STANDARD HYPODERMIC NEEDLE,ALUMINUM HUB: Brand: MONOJECT

## (undated) DEVICE — SET SURG INSTR MINI VASC

## (undated) DEVICE — UNIVERSAL DRAPE: Brand: MEDLINE INDUSTRIES, INC.

## (undated) DEVICE — INTENDED FOR TISSUE SEPARATION, AND OTHER PROCEDURES THAT REQUIRE A SHARP SURGICAL BLADE TO PUNCTURE OR CUT.: Brand: BARD-PARKER ® STAINLESS STEEL BLADES

## (undated) DEVICE — C-ARM: Brand: UNBRANDED

## (undated) DEVICE — MAJOR VASCULAR: Brand: MEDLINE INDUSTRIES, INC.

## (undated) DEVICE — APPLICATOR MEDICATED 26 CC SOLUTION HI LT ORNG CHLORAPREP

## (undated) DEVICE — GLOVE SURG SZ 65 THK91MIL LTX FREE SYN POLYISOPRENE

## (undated) DEVICE — SOLUTION IV 100ML 0.9% SOD CHL PLAS CONT USP VIAFLX 1 PER

## (undated) DEVICE — ADHESIVE SKIN CLOSURE TOP 36 CC HI VISC DERMBND MINI

## (undated) DEVICE — GLOVE ORANGE PI 7   MSG9070

## (undated) DEVICE — SYRINGE MED 10ML TRNSLUC BRL PLUNG BLK MRK POLYPR CTRL

## (undated) DEVICE — 18 GA N.G. KIT, 10 PACK: Brand: SITE-RITE

## (undated) DEVICE — ELECTRODE PT RET AD L9FT HI MOIST COND ADH HYDRGEL CORDED

## (undated) DEVICE — DECANTER BAG 9": Brand: MEDLINE INDUSTRIES, INC.